# Patient Record
Sex: FEMALE | Race: BLACK OR AFRICAN AMERICAN | Employment: FULL TIME | URBAN - METROPOLITAN AREA
[De-identification: names, ages, dates, MRNs, and addresses within clinical notes are randomized per-mention and may not be internally consistent; named-entity substitution may affect disease eponyms.]

---

## 2017-01-04 NOTE — LETTER
June 11, 2020     [address] [place] [address]      Dear Phillip Moore:    Below are the results from your recent visit:    Resulted Orders   T PALLIDUM SCREENING CASCADE   Result Value Ref Range    Treponemal Antibodies Negative Negat
Self

## 2017-01-26 ENCOUNTER — TELEPHONE (OUTPATIENT)
Dept: INTERNAL MEDICINE CLINIC | Facility: CLINIC | Age: 32
End: 2017-01-26

## 2017-09-05 ENCOUNTER — OFFICE VISIT (OUTPATIENT)
Dept: INTERNAL MEDICINE CLINIC | Facility: CLINIC | Age: 32
End: 2017-09-05

## 2017-09-05 ENCOUNTER — NURSE TRIAGE (OUTPATIENT)
Dept: OTHER | Age: 32
End: 2017-09-05

## 2017-09-05 VITALS
WEIGHT: 163 LBS | DIASTOLIC BLOOD PRESSURE: 85 MMHG | OXYGEN SATURATION: 100 % | HEART RATE: 102 BPM | BODY MASS INDEX: 26.2 KG/M2 | SYSTOLIC BLOOD PRESSURE: 126 MMHG | TEMPERATURE: 98 F | HEIGHT: 66 IN

## 2017-09-05 DIAGNOSIS — J04.0 LARYNGITIS: Primary | ICD-10-CM

## 2017-09-05 PROCEDURE — 99213 OFFICE O/P EST LOW 20 MIN: CPT | Performed by: INTERNAL MEDICINE

## 2017-09-05 PROCEDURE — 99212 OFFICE O/P EST SF 10 MIN: CPT | Performed by: INTERNAL MEDICINE

## 2017-09-05 RX ORDER — AMOXICILLIN 500 MG/1
TABLET, FILM COATED ORAL
Refills: 0 | COMMUNITY
Start: 2017-07-14 | End: 2017-09-05

## 2017-09-05 RX ORDER — FLUCONAZOLE 150 MG/1
TABLET ORAL
Refills: 0 | COMMUNITY
Start: 2017-08-02 | End: 2017-09-29

## 2017-09-05 RX ORDER — IBUPROFEN 600 MG/1
TABLET ORAL
Refills: 0 | COMMUNITY
Start: 2017-07-14 | End: 2017-09-29

## 2017-09-05 NOTE — TELEPHONE ENCOUNTER
Reason for Disposition  • SEVERE sore throat pain    Protocols used: SORE THROAT-A-OH  Action Requested: Summary for Provider     []  Critical Lab, Recommendations Needed  [] Need Additional Advice  [x]   FYI    []   Need Orders  [] Need Medications Sent

## 2017-09-05 NOTE — PROGRESS NOTES
Patient ID: Linda Sommers is a 27year old female. Patient presents with:  Sore Throat: x3 days  Cough: Reports SOB when coughing        HISTORY OF PRESENT ILLNESS:   HPI  Patient resents for above. Has lost her voice for the past 3 days.   Has been co name: N/A    Years of education: N/A  Number of children: N/A     Occupational History  None on file     Social History Main Topics   Smoking status: Never Smoker    Smokeless tobacco: Never Used    Alcohol use Yes  0.0 oz/week     Drug use: No    Sexual a

## 2017-09-25 ENCOUNTER — NURSE TRIAGE (OUTPATIENT)
Dept: OTHER | Age: 32
End: 2017-09-25

## 2017-09-25 NOTE — TELEPHONE ENCOUNTER
Action Requested: Summary for Provider     []  Critical Lab, Recommendations Needed  [] Need Additional Advice  []   FYI    []   Need Orders  [] Need Medications Sent to Pharmacy  []  Other     SUMMARY: Appointment with Dr Nathan Macias on 9/29 Friday  1:40 P

## 2017-09-29 ENCOUNTER — OFFICE VISIT (OUTPATIENT)
Dept: INTERNAL MEDICINE CLINIC | Facility: CLINIC | Age: 32
End: 2017-09-29

## 2017-09-29 VITALS
HEIGHT: 66 IN | HEART RATE: 96 BPM | BODY MASS INDEX: 25.39 KG/M2 | DIASTOLIC BLOOD PRESSURE: 88 MMHG | WEIGHT: 158 LBS | SYSTOLIC BLOOD PRESSURE: 129 MMHG | TEMPERATURE: 98 F

## 2017-09-29 DIAGNOSIS — T78.40XD ALLERGIC STATE, SUBSEQUENT ENCOUNTER: ICD-10-CM

## 2017-09-29 DIAGNOSIS — N76.1 SUBACUTE VAGINITIS: ICD-10-CM

## 2017-09-29 DIAGNOSIS — J02.8 PHARYNGITIS DUE TO OTHER ORGANISM: Primary | ICD-10-CM

## 2017-09-29 DIAGNOSIS — L65.9 HAIR LOSS: ICD-10-CM

## 2017-09-29 PROCEDURE — 99214 OFFICE O/P EST MOD 30 MIN: CPT | Performed by: INTERNAL MEDICINE

## 2017-09-29 PROCEDURE — 99212 OFFICE O/P EST SF 10 MIN: CPT | Performed by: INTERNAL MEDICINE

## 2017-09-29 RX ORDER — AMOXICILLIN AND CLAVULANATE POTASSIUM 875; 125 MG/1; MG/1
1 TABLET, FILM COATED ORAL 2 TIMES DAILY
Qty: 20 TABLET | Refills: 0 | Status: SHIPPED | OUTPATIENT
Start: 2017-09-29 | End: 2017-10-09

## 2017-09-29 RX ORDER — FLUCONAZOLE 150 MG/1
TABLET ORAL
Qty: 3 TABLET | Refills: 0 | Status: SHIPPED | OUTPATIENT
Start: 2017-09-29 | End: 2017-10-27

## 2017-10-07 NOTE — PROGRESS NOTES
HPI:    Patient ID: Willam Yeh is a 27year old female. HPI      Review of Systems   Constitutional: Positive for fatigue. Negative for fever. HENT: Positive for congestion and sore throat. Eyes: Negative. Respiratory: Negative.     Hannah Santa Ear: External ear normal.   Left Ear: External ear normal.   Nose: Nose normal.   Mouth/Throat: Oropharynx is clear and moist. No oropharyngeal exudate. Eyes: Conjunctivae and EOM are normal. Pupils are equal, round, and reactive to light.  Right eye exhi 0      Sig: Take 1 tablet by mouth 2 (two) times daily.            Imaging & Referrals:  ALLERGY - INTERNAL  DERM - INTERNAL        CAIN#[id number]

## 2017-10-20 ENCOUNTER — PATIENT MESSAGE (OUTPATIENT)
Dept: INTERNAL MEDICINE CLINIC | Facility: CLINIC | Age: 32
End: 2017-10-20

## 2017-10-20 ENCOUNTER — NURSE TRIAGE (OUTPATIENT)
Dept: INTERNAL MEDICINE CLINIC | Facility: CLINIC | Age: 32
End: 2017-10-20

## 2017-10-20 NOTE — TELEPHONE ENCOUNTER
From: Sidra Camargo  To: Rima Merino MD  Sent: 10/20/2017 12:30 PM CDT  Subject: Visit Follow-up Question    Yesterday I noticed a slight odor coming from my vagina and a slight white/yellow colored discharged.  I was given an antibiotic Amoxicillin-

## 2017-10-20 NOTE — TELEPHONE ENCOUNTER
TERRI Hamilton: Advised patient of Dr. Quinonez [place] note below. Patient verbalized understanding.  Offered patient appt with Dr. Denise Hamilton for Monday but patient stated she could not come in until Friday Oct. 27. Advised patient she could also go to Urgent Care

## 2017-10-20 NOTE — TELEPHONE ENCOUNTER
Action Requested: Summary for Provider     []  Critical Lab, Recommendations Needed  [x] Need Additional Advice  []   FYI    []   Need Orders  [x] Need Medications Sent to Pharmacy  []  Other     SUMMARY: ADVICE NEEDED, vaginal discharge    Pt states she w

## 2017-10-20 NOTE — TELEPHONE ENCOUNTER
NEED TO BE RE EVALAUTED   WITH DISCHARGE DESCRIBED  WILL NEED TO TO VAGINAL  CULTURE OR TEST NEEDED  SHE WAS COVERED FOR VAGINSOSIS AND YEAST  WILL HAVE TO RULE OUT OTHER CAUSE  NEED TO BE SEE

## 2017-10-27 ENCOUNTER — OFFICE VISIT (OUTPATIENT)
Dept: INTERNAL MEDICINE CLINIC | Facility: CLINIC | Age: 32
End: 2017-10-27

## 2017-10-27 VITALS
HEIGHT: 66 IN | DIASTOLIC BLOOD PRESSURE: 73 MMHG | BODY MASS INDEX: 24.59 KG/M2 | HEART RATE: 105 BPM | WEIGHT: 153 LBS | SYSTOLIC BLOOD PRESSURE: 109 MMHG | TEMPERATURE: 98 F

## 2017-10-27 DIAGNOSIS — N76.1 SUBACUTE VAGINITIS: Primary | ICD-10-CM

## 2017-10-27 PROCEDURE — 99212 OFFICE O/P EST SF 10 MIN: CPT | Performed by: INTERNAL MEDICINE

## 2017-10-27 PROCEDURE — 99213 OFFICE O/P EST LOW 20 MIN: CPT | Performed by: INTERNAL MEDICINE

## 2017-10-28 ENCOUNTER — PATIENT MESSAGE (OUTPATIENT)
Dept: INTERNAL MEDICINE CLINIC | Facility: CLINIC | Age: 32
End: 2017-10-28

## 2017-10-29 ENCOUNTER — PATIENT MESSAGE (OUTPATIENT)
Dept: OTHER | Age: 32
End: 2017-10-29

## 2017-10-29 ENCOUNTER — TELEPHONE (OUTPATIENT)
Dept: INTERNAL MEDICINE CLINIC | Facility: CLINIC | Age: 32
End: 2017-10-29

## 2017-10-30 ENCOUNTER — TELEPHONE (OUTPATIENT)
Dept: OTHER | Age: 32
End: 2017-10-30

## 2017-10-30 NOTE — TELEPHONE ENCOUNTER
From: Krzysztof Harper  To: Charley Donohue MD  Sent: 10/28/2017 1:45 PM CDT  Subject: Visit Nohemi Scott,  I need a doctor's note for work. I missed work on Friday to attend the doctors appointment. I return to work on Monday.  I

## 2017-10-30 NOTE — TELEPHONE ENCOUNTER
Patient is calling and requesting a Return to Work note, she also sent a [employer] email/message regarding the note last Friday 10/28/17, she missed work Friday and returning to work today 10/30. Please advise. Carol Herbert

## 2017-10-30 NOTE — TELEPHONE ENCOUNTER
Treat BV       ERX sent to pharmacy for   metrogel vaginal      Call paient    Notes Recorded by Corbin Rosas MD on 10/29/2017 at 9:48 PM CDT  Send letter and copy of test result.   Gonorrhea/chlamydia negative  Positive for bacterial vaginosis

## 2017-11-01 NOTE — TELEPHONE ENCOUNTER
Spoke with Meseret Mclaughlin and verified patient picked up Rx for Metrogel 10/30/17. Spoke with patient and letter mailed out today. No further questions/concerns at this time.

## 2017-11-04 NOTE — PROGRESS NOTES
HPI:    Patient ID: Boaz August is a 27year old female. HPI    Vaginal discharge and irritatioin    Review of Systems   Constitutional: Negative. Gastrointestinal: Negative. Genitourinary: Positive for vaginal discharge and vaginal pain.  Renae Arroyo Subacute vaginitis  (primary encounter diagnosis)  Plan: GENITAL VAGINOSIS SCREEN, CHLAMYDIA/GONOCOCCUS,        NOELLE, GENITAL VAGINOSIS SCREEN,         CHLAMYDIA/GONOCOCCUS, NOELLE               Orders Placed This Encounter      Gentital vaginosis screen

## 2017-12-10 ENCOUNTER — PATIENT MESSAGE (OUTPATIENT)
Dept: INTERNAL MEDICINE CLINIC | Facility: CLINIC | Age: 32
End: 2017-12-10

## 2017-12-10 RX ORDER — DESOGESTREL AND ETHINYL ESTRADIOL 0.15-0.03
1 KIT ORAL DAILY
Qty: 84 TABLET | Refills: 0 | Status: CANCELLED | OUTPATIENT
Start: 2017-12-10

## 2017-12-10 RX ORDER — DESOGESTREL AND ETHINYL ESTRADIOL 0.15-0.03
1 KIT ORAL DAILY
Qty: 3 PACKAGE | Refills: 3 | Status: CANCELLED
Start: 2017-12-10

## 2017-12-11 ENCOUNTER — TELEPHONE (OUTPATIENT)
Dept: OTHER | Age: 32
End: 2017-12-11

## 2017-12-11 RX ORDER — DESOGESTREL AND ETHINYL ESTRADIOL 0.15-0.03
1 KIT ORAL DAILY
Qty: 1 PACKAGE | Refills: 0 | Status: SHIPPED | OUTPATIENT
Start: 2017-12-11 | End: 2017-12-29

## 2017-12-11 NOTE — TELEPHONE ENCOUNTER
Pharmacy     [address] AT [facility], [phone], [phone]   Medication Detail      Disp Refills Start End    Desogestrel-Ethinyl Estradiol (DESOGEN) 0.15-30 MG-MCG Oral Tab 1 Packa

## 2017-12-11 NOTE — TELEPHONE ENCOUNTER
Patient is out of birth control medication - states was supposed to start yesterday. Sent a my chart message as well.      Gynecology Medications  Protocol Criteria:  · Appointment scheduled in the past 12 months or the next 3 months  · Pap smear in the pas

## 2017-12-11 NOTE — TELEPHONE ENCOUNTER
From: Estrella Moran  To: Cassie Morris MD  Sent: 12/10/2017 12:07 PM CST  Subject: Prescription Question     Can I please have a refill on my birth control pills? I am at start my pills up again today and pharmacy informed me that I am out of refills.

## 2017-12-29 ENCOUNTER — OFFICE VISIT (OUTPATIENT)
Dept: OBGYN CLINIC | Facility: CLINIC | Age: 32
End: 2017-12-29

## 2017-12-29 VITALS
HEART RATE: 86 BPM | HEIGHT: 65.5 IN | BODY MASS INDEX: 26.31 KG/M2 | DIASTOLIC BLOOD PRESSURE: 75 MMHG | SYSTOLIC BLOOD PRESSURE: 111 MMHG | WEIGHT: 159.81 LBS

## 2017-12-29 DIAGNOSIS — Z01.419 ENCOUNTER FOR GYNECOLOGICAL EXAMINATION WITHOUT ABNORMAL FINDING: Primary | ICD-10-CM

## 2017-12-29 DIAGNOSIS — Z30.41 ENCOUNTER FOR BIRTH CONTROL PILLS MAINTENANCE: ICD-10-CM

## 2017-12-29 PROCEDURE — 99395 PREV VISIT EST AGE 18-39: CPT | Performed by: OBSTETRICS & GYNECOLOGY

## 2017-12-29 RX ORDER — DESOGESTREL AND ETHINYL ESTRADIOL 0.15-0.03
1 KIT ORAL DAILY
Qty: 3 PACKAGE | Refills: 4 | Status: SHIPPED | OUTPATIENT
Start: 2017-12-29 | End: 2019-12-30

## 2017-12-29 NOTE — PROGRESS NOTES
Well Woman Exam    HPI:  The patient is a 30yo female who presents for annual exam. She has no complaints. She is happy with OCPs but not happy with generic that she was just given. She asked for me to write for dispense as written.      Reviewed medical an BASE) MCG/ACT Inhalation Aero Soln, Inhale 4 puffs every 2 hours, Disp: 1 Inhaler, Rfl: 0    ALLERGIES:    Egg                     Respiratory failure  Shellfish               Respiratory failure      Review of Systems:  Constitutional:  Denies fevers and 2. Contraception:   1. eRx sent for OCPs   3. Breast Health:     1.  Reviewed current guidelines with recommendation to start mammograms at age 36; however, ACOG encourages shared decision making with the patient and together we reach appropriate screenin

## 2018-01-26 ENCOUNTER — OFFICE VISIT (OUTPATIENT)
Dept: INTERNAL MEDICINE CLINIC | Facility: CLINIC | Age: 33
End: 2018-01-26

## 2018-01-26 VITALS
BODY MASS INDEX: 25.99 KG/M2 | HEART RATE: 78 BPM | DIASTOLIC BLOOD PRESSURE: 80 MMHG | WEIGHT: 156 LBS | HEIGHT: 65 IN | SYSTOLIC BLOOD PRESSURE: 120 MMHG | TEMPERATURE: 98 F | RESPIRATION RATE: 18 BRPM

## 2018-01-26 DIAGNOSIS — R21 RASH: ICD-10-CM

## 2018-01-26 DIAGNOSIS — J01.00 ACUTE NON-RECURRENT MAXILLARY SINUSITIS: Primary | ICD-10-CM

## 2018-01-26 PROCEDURE — 99213 OFFICE O/P EST LOW 20 MIN: CPT | Performed by: PHYSICIAN ASSISTANT

## 2018-01-26 RX ORDER — AMOXICILLIN AND CLAVULANATE POTASSIUM 875; 125 MG/1; MG/1
1 TABLET, FILM COATED ORAL 2 TIMES DAILY
Qty: 20 TABLET | Refills: 0 | Status: SHIPPED | OUTPATIENT
Start: 2018-01-26 | End: 2018-02-05

## 2018-01-26 RX ORDER — FLUCONAZOLE 150 MG/1
TABLET ORAL
Qty: 2 TABLET | Refills: 0 | Status: SHIPPED | OUTPATIENT
Start: 2018-01-26 | End: 2020-01-17

## 2018-01-26 NOTE — PROGRESS NOTES
HPI:    Patient ID: Juliet Murray is a 27year old female. HPI  Patient presents today with upper respiratory symptoms for the last 2 weeks. Symptoms have significantly worsening in the last 2 days.  She has sore throat, nasal congestion, left maxillar Inhalation Aero Soln Inhale 2 puffs (0.08 mg total) into the lungs 2 (two) times daily.  Disp: 1 Inhaler Rfl: 5   Albuterol Sulfate HFA (PROAIR HFA) 108 (90 BASE) MCG/ACT Inhalation Aero Soln Inhale 4 puffs every 2 hours Disp: 1 Inhaler Rfl: 0     Allergies days. Apply Eucerin liberally as needed to the affected areas. Drink plenty of fluids. No orders of the defined types were placed in this encounter.       Meds This Visit:  Signed Prescriptions Disp Refills    Amoxicillin-Pot Clavulanate 875-125 MG Oral

## 2018-01-31 ENCOUNTER — TELEPHONE (OUTPATIENT)
Dept: OTHER | Age: 33
End: 2018-01-31

## 2018-01-31 RX ORDER — LEVOFLOXACIN 500 MG/1
500 TABLET, FILM COATED ORAL DAILY
Qty: 7 TABLET | Refills: 0 | Status: SHIPPED | OUTPATIENT
Start: 2018-01-31 | End: 2020-01-17

## 2018-01-31 RX ORDER — METHYLPREDNISOLONE 4 MG/1
TABLET ORAL
Qty: 1 KIT | Refills: 0 | Status: SHIPPED | OUTPATIENT
Start: 2018-01-31 | End: 2019-08-16 | Stop reason: ALTCHOICE

## 2018-01-31 NOTE — TELEPHONE ENCOUNTER
Please advise. Thank you. Please reply to pool: EM RN TRIAGE    Pt calling (Name and  verified) stating that she saw CAITLIN. Froy last Friday and was instructed to call back if her Sx did not improve for possible additional Rx.     Pt states that she has b

## 2018-01-31 NOTE — TELEPHONE ENCOUNTER
Spoke pt and advised of Carmelita's message to stop amoxicillin and switch to levaquin. Pt verb understanding.

## 2018-01-31 NOTE — TELEPHONE ENCOUNTER
Spoke with pt and Carmelita Mcduffie's message given. Pt verb understanding. Pt asking if she is supposed to stop amoxicillin.

## 2018-01-31 NOTE — TELEPHONE ENCOUNTER
Levaquin 500 mg once daily with food for 7 days. Medrol dose pack as directed. Continue inhaler as needed. Rx sent to pharmacy.

## 2018-03-06 NOTE — TELEPHONE ENCOUNTER
Call placed to Pt  Pt made aware wet mount and GC/Chlam results.   Pt made aware TG's recommendation.   Pt verb understanding.   Please see rx called in     Pt called back for update-advised Letter c/p and resent to Mychart/Pt received

## 2018-09-20 ENCOUNTER — OFFICE VISIT (OUTPATIENT)
Dept: OBGYN CLINIC | Facility: CLINIC | Age: 33
End: 2018-09-20
Payer: COMMERCIAL

## 2018-09-20 VITALS
SYSTOLIC BLOOD PRESSURE: 110 MMHG | HEART RATE: 86 BPM | DIASTOLIC BLOOD PRESSURE: 74 MMHG | WEIGHT: 165 LBS | BODY MASS INDEX: 27 KG/M2

## 2018-09-20 DIAGNOSIS — L29.2 VULVAR ITCHING: Primary | ICD-10-CM

## 2018-09-20 PROCEDURE — 99213 OFFICE O/P EST LOW 20 MIN: CPT | Performed by: OBSTETRICS & GYNECOLOGY

## 2018-09-20 NOTE — PROGRESS NOTES
Yelitza Tolentino    7/10/1985       Patient presents with:  Gyn Exam: Vaginal itching   Pt with vulvar itching since her last menses ended 4 days ago. She describes itching,buring and white d/c.   She has had her plaquenil increased recently and wonders shaved  Urethral Meatus:  normal in size, location, without lesions and prolapse  Bladder:  No fullness, masses or tenderness  Vagina:  Normal appearance without lesions,white creamy d/c- ?  Not c/w yeast  Cervix:  Normal without tenderness on motion  Perin

## 2018-09-22 LAB
GENITAL VAGINOSIS SCREEN: NEGATIVE
TRICHOMONAS SCREEN: NEGATIVE

## 2018-09-24 ENCOUNTER — PATIENT MESSAGE (OUTPATIENT)
Dept: OBGYN CLINIC | Facility: CLINIC | Age: 33
End: 2018-09-24

## 2018-09-24 NOTE — TELEPHONE ENCOUNTER
From: Matthew Cesar  To: Christie Hill MD  Sent: 9/24/2018 6:20 PM CDT  Subject: Test Results Parth Kunz,    Did my test results come back from my office visit on Thursday 9/20/18?  Thank you!!

## 2018-09-25 ENCOUNTER — TELEPHONE (OUTPATIENT)
Dept: OBGYN CLINIC | Facility: CLINIC | Age: 33
End: 2018-09-25

## 2018-09-25 RX ORDER — FLUCONAZOLE 150 MG/1
150 TABLET ORAL ONCE
Qty: 1 TABLET | Refills: 0 | Status: SHIPPED | OUTPATIENT
Start: 2018-09-25 | End: 2018-09-25

## 2018-09-25 NOTE — TELEPHONE ENCOUNTER
Pt informed that CAP is seeing pts and has not reviewed message yet. Pt informed that if she does not want to wait for recs then she can start Monistat. Pt stated she would prefer Diflucan and will wait for CAps recs.

## 2018-09-25 NOTE — TELEPHONE ENCOUNTER
Okay to send an erx for diflucan 150mg po x1 for yeast infection if pt prefers that to monistat, although I do believe that the topical treatments work better

## 2018-12-06 RX ORDER — DESOGESTREL AND ETHINYL ESTRADIOL 0.15-0.03
KIT ORAL
Qty: 84 TABLET | Refills: 0 | OUTPATIENT
Start: 2018-12-06

## 2018-12-06 RX ORDER — DESOGESTREL AND ETHINYL ESTRADIOL 0.15-0.03
1 KIT ORAL DAILY
Qty: 84 TABLET | Refills: 0 | Status: SHIPPED | OUTPATIENT
Start: 2018-12-06 | End: 2019-12-13

## 2018-12-06 NOTE — TELEPHONE ENCOUNTER
LAST ANNUAL 12-29-17, LAST PAP 5-27-14 AND NEXT ANNUAL SCHEDULED FOR 12-31-18. DECLINED REFILL REQUEST FOR ISIBLOOM BECAUSE RX WAS WRITTEN FOR MICHAEL MARK. REFILL SENT FOR #84 PER PROTOCOL.

## 2019-08-16 ENCOUNTER — OFFICE VISIT (OUTPATIENT)
Dept: INTERNAL MEDICINE CLINIC | Facility: CLINIC | Age: 34
End: 2019-08-16
Payer: COMMERCIAL

## 2019-08-16 VITALS
DIASTOLIC BLOOD PRESSURE: 81 MMHG | SYSTOLIC BLOOD PRESSURE: 123 MMHG | HEART RATE: 76 BPM | HEIGHT: 65 IN | WEIGHT: 164 LBS | TEMPERATURE: 99 F | OXYGEN SATURATION: 100 % | BODY MASS INDEX: 27.32 KG/M2

## 2019-08-16 DIAGNOSIS — R05.9 COUGH: Primary | ICD-10-CM

## 2019-08-16 PROCEDURE — 99213 OFFICE O/P EST LOW 20 MIN: CPT | Performed by: PHYSICIAN ASSISTANT

## 2019-08-16 RX ORDER — BENZONATATE 100 MG/1
100 CAPSULE ORAL 3 TIMES DAILY PRN
Qty: 30 CAPSULE | Refills: 0 | Status: SHIPPED | OUTPATIENT
Start: 2019-08-16 | End: 2020-01-17

## 2019-08-16 NOTE — PROGRESS NOTES
HPI:    Patient ID: Linda Sommers is a 37year old female. HPI   Pt presents complaining of HA congestion, left ear drainage three days. States her daughter was sick earlier. Pt also complains of a cough, with intermittent mucus production.  Denies fev Smoking status: Never Smoker      Smokeless tobacco: Never Used    Alcohol use:  Yes      Alcohol/week: 0.0 standard drinks    Past Surgical History:   Procedure Laterality Date   •      • OTHER SURGICAL HISTORY      excision nasal mass, benign   \  Fam No orders of the defined types were placed in this encounter. No follow-ups on file.     Meds This Visit:  Requested Prescriptions     Signed Prescriptions Disp Refills   • benzonatate (TESSALON PERLES) 100 MG Oral Cap 30 capsule 0     Sig: Take 1 caps

## 2019-09-06 ENCOUNTER — PATIENT MESSAGE (OUTPATIENT)
Dept: INTERNAL MEDICINE CLINIC | Facility: CLINIC | Age: 34
End: 2019-09-06

## 2019-09-07 ENCOUNTER — TELEPHONE (OUTPATIENT)
Dept: INTERNAL MEDICINE CLINIC | Facility: CLINIC | Age: 34
End: 2019-09-07

## 2019-09-07 NOTE — TELEPHONE ENCOUNTER
From: Usman Cyr  To: Damaris Wynn PA-C  Sent: 9/6/2019 3:32 PM CDT  Subject: Prescription Question    Hi,    I still have a cough from my last visit on 8/16. Can you send me something to my pharmacy on file for my cough? Thank you.

## 2019-09-07 NOTE — TELEPHONE ENCOUNTER
Per last office visit on 8/16/19 an rx was sent to the pharmacy for [facility]. Attempt made to contact patient to further triage; no answer LMTCB. [employer] message sent as well.         ----- Message from [address] [place] [address].  Joanna sent at 9/6/2019  3:32 PM CDT

## 2019-09-09 RX ORDER — CODEINE PHOSPHATE AND GUAIFENESIN 10; 100 MG/5ML; MG/5ML
5 SOLUTION ORAL EVERY 6 HOURS PRN
Qty: 120 ML | Refills: 0 | OUTPATIENT
Start: 2019-09-09 | End: 2020-01-17

## 2019-09-09 NOTE — TELEPHONE ENCOUNTER
Spoke with patient and relayed AA message below--patient verbalizes understanding and agreement. No further questions/concerns at this time. Spoke with Carmita Harvey pharmacist and phoned in [facility] as ordered by AA today.  No further questions/

## 2019-09-09 NOTE — TELEPHONE ENCOUNTER
Spoke with [facility] pharmacy--verifies patient did not  [employer] as ordered 8/16/2019--it is ready for . Spoke with patient--states she DID  Tesaakashon Perles 8/16/19--has 10 pills left.  \"She told me sometimes this medicatio

## 2019-09-09 NOTE — TELEPHONE ENCOUNTER
Please phone in rx for cheratussin [facility] and let pt know it can make you drowsy

## 2019-11-26 ENCOUNTER — OFFICE VISIT (OUTPATIENT)
Dept: OBGYN CLINIC | Facility: CLINIC | Age: 34
End: 2019-11-26
Payer: COMMERCIAL

## 2019-11-26 VITALS
WEIGHT: 157 LBS | SYSTOLIC BLOOD PRESSURE: 107 MMHG | DIASTOLIC BLOOD PRESSURE: 71 MMHG | BODY MASS INDEX: 26 KG/M2 | HEART RATE: 90 BPM

## 2019-11-26 DIAGNOSIS — L29.2 VULVAR ITCHING: Primary | ICD-10-CM

## 2019-11-26 PROCEDURE — 99213 OFFICE O/P EST LOW 20 MIN: CPT | Performed by: OBSTETRICS & GYNECOLOGY
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
## 2019-12-13 ENCOUNTER — OFFICE VISIT (OUTPATIENT)
Dept: INTERNAL MEDICINE CLINIC | Facility: CLINIC | Age: 34
End: 2019-12-13
Payer: COMMERCIAL

## 2019-12-13 VITALS
SYSTOLIC BLOOD PRESSURE: 109 MMHG | WEIGHT: 155.06 LBS | RESPIRATION RATE: 17 BRPM | DIASTOLIC BLOOD PRESSURE: 73 MMHG | BODY MASS INDEX: 25.83 KG/M2 | HEIGHT: 65 IN | HEART RATE: 88 BPM | TEMPERATURE: 98 F

## 2019-12-13 DIAGNOSIS — J01.00 ACUTE NON-RECURRENT MAXILLARY SINUSITIS: Primary | ICD-10-CM

## 2019-12-13 PROCEDURE — 99213 OFFICE O/P EST LOW 20 MIN: CPT | Performed by: NURSE PRACTITIONER

## 2019-12-13 RX ORDER — BENZONATATE 100 MG/1
100 CAPSULE ORAL 3 TIMES DAILY PRN
Qty: 20 CAPSULE | Refills: 0 | Status: SHIPPED | OUTPATIENT
Start: 2019-12-13 | End: 2019-12-20

## 2019-12-13 RX ORDER — FLUCONAZOLE 150 MG/1
150 TABLET ORAL ONCE
Qty: 1 TABLET | Refills: 0 | Status: SHIPPED | OUTPATIENT
Start: 2019-12-13 | End: 2019-12-13

## 2019-12-13 RX ORDER — AZITHROMYCIN 250 MG/1
TABLET, FILM COATED ORAL
Qty: 6 TABLET | Refills: 0 | Status: SHIPPED | OUTPATIENT
Start: 2019-12-13 | End: 2020-01-17

## 2019-12-13 NOTE — PROGRESS NOTES
HPI:    Patient ID: Osman Quigley is a 37year old female. LMP First Wednesday of this month. Dec 4th, 2019. HPI Patient pain and pressure in her left sinus area. Pain started a week ago. She is fatigued and complaining of swollen glands (Neck Pain). 100 MG Oral Cap Take 1 capsule (100 mg total) by mouth 3 (three) times daily as needed for cough. (Patient not taking: Reported on 12/13/2019 ) 30 capsule 0   • levofloxacin 500 MG Oral Tab Take 1 tablet (500 mg total) by mouth daily.  (Patient not taking: deficit. Coordination normal.   Skin: Skin is warm and dry. No rash noted. Psychiatric: She has a normal mood and affect.  Thought content normal.     /73   Pulse 88   Temp 98.4 °F (36.9 °C) (Oral)   Resp 17   Ht 5' 5\" (1.651 m)   Wt 155 lb 1 oz (7

## 2019-12-14 NOTE — ASSESSMENT & PLAN NOTE
A/P 37year old female with complaints of pain and pressure in her left maxillary sinus. Pain level is 4/10 and is constant. She has had swollen glands, nasal burning, cough, and fatigue for the past 5 days. She has increased her fluid intake.  Patient requ

## 2019-12-14 NOTE — PROGRESS NOTES
Sidra Camargo is a 37year old female. LMP First Wednesday of this month. HPI Patient pain and pressure in her left sinus area. Pain started a week ago. She is fatigued. Pain level 4/10. Pain is constant. She also has swollen glands.   She has been t ) 7 tablet 0   • fluconazole 150 MG Oral Tab Take one tablet by mouth once. Can take second tablet after 3 days if needed.  (Patient not taking: Reported on 12/13/2019 ) 2 tablet 0   • Beclomethasone Dipropionate 40 MCG/ACT Inhalation Aero Soln Inhale 2 puf Readings from Last 2 Encounters:  12/13/19 : 155 lb 1 oz (70.3 kg)  11/26/19 : 157 lb (71.2 kg)    Body mass index is 25.8 kg/m². (2)           ASSESSMENT/PLAN:     Problem List Items Addressed This Visit     None         A/P 37year old female with complai

## 2019-12-17 ENCOUNTER — TELEPHONE (OUTPATIENT)
Dept: INTERNAL MEDICINE CLINIC | Facility: CLINIC | Age: 34
End: 2019-12-17

## 2019-12-17 NOTE — TELEPHONE ENCOUNTER
Left message to call back  Spoke with [facility], they [facility], left msg to see if patient wants to transfer script to another [facility] or other pharmacy.  Script was prescribed on 12/13, confirm if anicetol

## 2019-12-17 NOTE — TELEPHONE ENCOUNTER
Advised patient that [facility] called indicating that Z-Zackery was on back order. Put patient on hold and called over to the [address] on [address] their systems are down and told me to call back later.  Advised patient that would try calling

## 2019-12-30 ENCOUNTER — OFFICE VISIT (OUTPATIENT)
Dept: OBGYN CLINIC | Facility: CLINIC | Age: 34
End: 2019-12-30
Payer: COMMERCIAL

## 2019-12-30 VITALS
DIASTOLIC BLOOD PRESSURE: 71 MMHG | HEART RATE: 87 BPM | SYSTOLIC BLOOD PRESSURE: 109 MMHG | WEIGHT: 157.38 LBS | HEIGHT: 65 IN | BODY MASS INDEX: 26.22 KG/M2

## 2019-12-30 DIAGNOSIS — Z01.419 WELL WOMAN EXAM: Primary | ICD-10-CM

## 2019-12-30 DIAGNOSIS — Z12.4 SCREENING FOR MALIGNANT NEOPLASM OF CERVIX: ICD-10-CM

## 2019-12-30 PROCEDURE — 99395 PREV VISIT EST AGE 18-39: CPT | Performed by: OBSTETRICS & GYNECOLOGY

## 2019-12-30 RX ORDER — DESOGESTREL AND ETHINYL ESTRADIOL 0.15-0.03
1 KIT ORAL DAILY
Qty: 3 PACKAGE | Refills: 3 | Status: SHIPPED | OUTPATIENT
Start: 2019-12-30 | End: 2020-01-02
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
## 2020-01-02 ENCOUNTER — TELEPHONE (OUTPATIENT)
Dept: OBGYN CLINIC | Facility: CLINIC | Age: 35
End: 2020-01-02

## 2020-01-02 RX ORDER — DESOGESTREL AND ETHINYL ESTRADIOL 0.15-0.03
1 KIT ORAL DAILY
Qty: 3 PACKAGE | Refills: 3 | Status: SHIPPED | OUTPATIENT
Start: 2020-01-02 | End: 2020-09-30

## 2020-01-02 NOTE — TELEPHONE ENCOUNTER
Message to [address] [place] [address]. Rx for Desogen was sent MICHAEL. Can generic be given?

## 2020-01-07 ENCOUNTER — TELEPHONE (OUTPATIENT)
Dept: OBGYN CLINIC | Facility: CLINIC | Age: 35
End: 2020-01-07

## 2020-01-07 RX ORDER — FLUCONAZOLE 150 MG/1
TABLET ORAL
Qty: 2 TABLET | Refills: 0 | Status: SHIPPED | OUTPATIENT
Start: 2020-01-07 | End: 2020-01-17

## 2020-01-07 NOTE — TELEPHONE ENCOUNTER
----- Message from Kenny Prasad DO sent at 1/3/2020  6:35 PM CST -----  Pap/hpv neg. Pt was having itching and genital culture was negative. Yeast on pap smear however.   Needs diflucan x 2 doses

## 2020-01-09 ENCOUNTER — TELEPHONE (OUTPATIENT)
Dept: OBGYN CLINIC | Facility: CLINIC | Age: 35
End: 2020-01-09

## 2020-01-09 NOTE — TELEPHONE ENCOUNTER
Pt requesting a copy of the work note that was given on 12/30/2019, also pt needs it signed. Please advise.     Can be sent Via [employer] or faxed over to work     FAX: [phone]

## 2020-01-17 ENCOUNTER — OFFICE VISIT (OUTPATIENT)
Dept: INTERNAL MEDICINE CLINIC | Facility: CLINIC | Age: 35
End: 2020-01-17
Payer: COMMERCIAL

## 2020-01-17 ENCOUNTER — APPOINTMENT (OUTPATIENT)
Dept: LAB | Age: 35
End: 2020-01-17
Attending: INTERNAL MEDICINE
Payer: COMMERCIAL

## 2020-01-17 VITALS
DIASTOLIC BLOOD PRESSURE: 67 MMHG | WEIGHT: 159 LBS | BODY MASS INDEX: 26.49 KG/M2 | HEART RATE: 86 BPM | TEMPERATURE: 98 F | SYSTOLIC BLOOD PRESSURE: 103 MMHG | HEIGHT: 65 IN

## 2020-01-17 DIAGNOSIS — Z00.00 ROUTINE GENERAL MEDICAL EXAMINATION AT A HEALTH CARE FACILITY: Primary | ICD-10-CM

## 2020-01-17 DIAGNOSIS — Z00.00 ROUTINE GENERAL MEDICAL EXAMINATION AT A HEALTH CARE FACILITY: ICD-10-CM

## 2020-01-17 LAB
ALBUMIN SERPL-MCNC: 3.7 G/DL (ref 3.4–5)
ALBUMIN/GLOB SERPL: 1 {RATIO} (ref 1–2)
ALP LIVER SERPL-CCNC: 34 U/L (ref 37–98)
ALT SERPL-CCNC: 16 U/L (ref 13–56)
ANION GAP SERPL CALC-SCNC: 2 MMOL/L (ref 0–18)
AST SERPL-CCNC: 11 U/L (ref 15–37)
BACTERIA UR QL AUTO: NEGATIVE /HPF
BILIRUB SERPL-MCNC: 0.3 MG/DL (ref 0.1–2)
BUN BLD-MCNC: 9 MG/DL (ref 7–18)
BUN/CREAT SERPL: 12.5 (ref 10–20)
CALCIUM BLD-MCNC: 8.3 MG/DL (ref 8.5–10.1)
CHLORIDE SERPL-SCNC: 108 MMOL/L (ref 98–112)
CHOLEST SMN-MCNC: 171 MG/DL (ref ?–200)
CO2 SERPL-SCNC: 30 MMOL/L (ref 21–32)
CREAT BLD-MCNC: 0.72 MG/DL (ref 0.55–1.02)
DEPRECATED RDW RBC AUTO: 40.9 FL (ref 35.1–46.3)
ERYTHROCYTE [DISTWIDTH] IN BLOOD BY AUTOMATED COUNT: 12.3 % (ref 11–15)
EST. AVERAGE GLUCOSE BLD GHB EST-MCNC: 97 MG/DL (ref 68–126)
GLOBULIN PLAS-MCNC: 3.8 G/DL (ref 2.8–4.4)
GLUCOSE BLD-MCNC: 71 MG/DL (ref 70–99)
HBA1C MFR BLD HPLC: 5 % (ref ?–5.7)
HCT VFR BLD AUTO: 39.7 % (ref 35–48)
HDLC SERPL-MCNC: 99 MG/DL (ref 40–59)
HGB BLD-MCNC: 12.4 G/DL (ref 12–16)
LDLC SERPL CALC-MCNC: 64 MG/DL (ref ?–100)
M PROTEIN MFR SERPL ELPH: 7.5 G/DL (ref 6.4–8.2)
MCH RBC QN AUTO: 27.9 PG (ref 26–34)
MCHC RBC AUTO-ENTMCNC: 31.2 G/DL (ref 31–37)
MCV RBC AUTO: 89.2 FL (ref 80–100)
NONHDLC SERPL-MCNC: 72 MG/DL (ref ?–130)
OSMOLALITY SERPL CALC.SUM OF ELEC: 287 MOSM/KG (ref 275–295)
PATIENT FASTING Y/N/NP: YES
PATIENT FASTING Y/N/NP: YES
PLATELET # BLD AUTO: 253 10(3)UL (ref 150–450)
POTASSIUM SERPL-SCNC: 3.9 MMOL/L (ref 3.5–5.1)
RBC # BLD AUTO: 4.45 X10(6)UL (ref 3.8–5.3)
RBC #/AREA URNS AUTO: 5 /HPF
SODIUM SERPL-SCNC: 140 MMOL/L (ref 136–145)
T4 FREE SERPL-MCNC: 0.9 NG/DL (ref 0.8–1.7)
TRIGL SERPL-MCNC: 42 MG/DL (ref 30–149)
TSI SER-ACNC: 0.53 MIU/ML (ref 0.36–3.74)
VLDLC SERPL CALC-MCNC: 8 MG/DL (ref 0–30)
WBC # BLD AUTO: 4.6 X10(3) UL (ref 4–11)
WBC #/AREA URNS AUTO: 2 /HPF

## 2020-01-17 PROCEDURE — 84443 ASSAY THYROID STIM HORMONE: CPT

## 2020-01-17 PROCEDURE — 83036 HEMOGLOBIN GLYCOSYLATED A1C: CPT

## 2020-01-17 PROCEDURE — 80061 LIPID PANEL: CPT

## 2020-01-17 PROCEDURE — 85027 COMPLETE CBC AUTOMATED: CPT

## 2020-01-17 PROCEDURE — 82306 VITAMIN D 25 HYDROXY: CPT

## 2020-01-17 PROCEDURE — 81015 MICROSCOPIC EXAM OF URINE: CPT

## 2020-01-17 PROCEDURE — 99395 PREV VISIT EST AGE 18-39: CPT | Performed by: INTERNAL MEDICINE

## 2020-01-17 PROCEDURE — 36415 COLL VENOUS BLD VENIPUNCTURE: CPT

## 2020-01-17 PROCEDURE — 84439 ASSAY OF FREE THYROXINE: CPT

## 2020-01-17 PROCEDURE — 80053 COMPREHEN METABOLIC PANEL: CPT

## 2020-01-17 NOTE — TELEPHONE ENCOUNTER
PER PATIENT REQUESTING TO HAVE THE NOTE FOR HER JOB / PT REQUESTING A COPY TO  AT [facility] BECAUSE HER JOB WOULD NOT ACCEPT A FAX COPY / Rosario Abel

## 2020-01-20 LAB — 25(OH)D3 SERPL-MCNC: 12.4 NG/ML (ref 30–100)

## 2020-01-24 ENCOUNTER — APPOINTMENT (OUTPATIENT)
Dept: LAB | Age: 35
End: 2020-01-24
Attending: INTERNAL MEDICINE
Payer: COMMERCIAL

## 2020-01-24 ENCOUNTER — TELEPHONE (OUTPATIENT)
Dept: INTERNAL MEDICINE CLINIC | Facility: CLINIC | Age: 35
End: 2020-01-24

## 2020-01-24 DIAGNOSIS — R31.29 OTHER MICROSCOPIC HEMATURIA: ICD-10-CM

## 2020-01-24 LAB
BACTERIA UR QL AUTO: NEGATIVE /HPF
RBC #/AREA URNS AUTO: 1 /HPF
WBC #/AREA URNS AUTO: 1 /HPF

## 2020-01-24 PROCEDURE — 81015 MICROSCOPIC EXAM OF URINE: CPT

## 2020-01-24 NOTE — TELEPHONE ENCOUNTER
Dr Lan Valles, please advise. Patient at work now, but need the 1/23/2020 letter from the doctor about work. She said her work requires the Jose Espinal on the note. 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     Please respond t

## 2020-02-27 ENCOUNTER — TELEPHONE (OUTPATIENT)
Dept: INTERNAL MEDICINE CLINIC | Facility: CLINIC | Age: 35
End: 2020-02-27

## 2020-02-27 NOTE — TELEPHONE ENCOUNTER
Spoke with patient, (  verified ) confirmed the appt for tomorrow at 10am with Merna      Patient verbalizes understanding and agrees

## 2020-02-27 NOTE — TELEPHONE ENCOUNTER
[facility] that leaves her unable to walk. Sched for 2/28 but Dr. Citlaly Smith will be out of the office that day, so she will not be seen at that time. Please advise patient.

## 2020-02-28 ENCOUNTER — OFFICE VISIT (OUTPATIENT)
Dept: INTERNAL MEDICINE CLINIC | Facility: CLINIC | Age: 35
End: 2020-02-28
Payer: COMMERCIAL

## 2020-02-28 ENCOUNTER — APPOINTMENT (OUTPATIENT)
Dept: LAB | Facility: HOSPITAL | Age: 35
End: 2020-02-28
Attending: PHYSICIAN ASSISTANT
Payer: COMMERCIAL

## 2020-02-28 VITALS
DIASTOLIC BLOOD PRESSURE: 80 MMHG | RESPIRATION RATE: 16 BRPM | HEIGHT: 66 IN | SYSTOLIC BLOOD PRESSURE: 120 MMHG | HEART RATE: 80 BPM | WEIGHT: 172 LBS | BODY MASS INDEX: 27.64 KG/M2

## 2020-02-28 DIAGNOSIS — R20.0 NUMBNESS AND TINGLING OF FOOT: ICD-10-CM

## 2020-02-28 DIAGNOSIS — M79.671 RIGHT FOOT PAIN: ICD-10-CM

## 2020-02-28 DIAGNOSIS — R20.2 NUMBNESS AND TINGLING OF FOOT: Primary | ICD-10-CM

## 2020-02-28 DIAGNOSIS — R20.2 NUMBNESS AND TINGLING OF FOOT: ICD-10-CM

## 2020-02-28 DIAGNOSIS — R20.0 NUMBNESS AND TINGLING OF FOOT: Primary | ICD-10-CM

## 2020-02-28 LAB
ALBUMIN SERPL-MCNC: 3.7 G/DL (ref 3.4–5)
ALBUMIN/GLOB SERPL: 1 {RATIO} (ref 1–2)
ALP LIVER SERPL-CCNC: 52 U/L (ref 37–98)
ALT SERPL-CCNC: 30 U/L (ref 13–56)
ANION GAP SERPL CALC-SCNC: 4 MMOL/L (ref 0–18)
AST SERPL-CCNC: 17 U/L (ref 15–37)
BILIRUB SERPL-MCNC: 0.3 MG/DL (ref 0.1–2)
BUN BLD-MCNC: 8 MG/DL (ref 7–18)
BUN/CREAT SERPL: 12.3 (ref 10–20)
CALCIUM BLD-MCNC: 8.9 MG/DL (ref 8.5–10.1)
CHLORIDE SERPL-SCNC: 104 MMOL/L (ref 98–112)
CO2 SERPL-SCNC: 31 MMOL/L (ref 21–32)
CREAT BLD-MCNC: 0.65 MG/DL (ref 0.55–1.02)
GLOBULIN PLAS-MCNC: 3.8 G/DL (ref 2.8–4.4)
GLUCOSE BLD-MCNC: 79 MG/DL (ref 70–99)
HAV IGM SER QL: 2 MG/DL (ref 1.6–2.6)
M PROTEIN MFR SERPL ELPH: 7.5 G/DL (ref 6.4–8.2)
OSMOLALITY SERPL CALC.SUM OF ELEC: 285 MOSM/KG (ref 275–295)
PATIENT FASTING Y/N/NP: NO
POTASSIUM SERPL-SCNC: 3.9 MMOL/L (ref 3.5–5.1)
SODIUM SERPL-SCNC: 139 MMOL/L (ref 136–145)
URATE SERPL-MCNC: 2.6 MG/DL (ref 2.6–6)
VIT B12 SERPL-MCNC: 465 PG/ML (ref 193–986)

## 2020-02-28 PROCEDURE — 84550 ASSAY OF BLOOD/URIC ACID: CPT

## 2020-02-28 PROCEDURE — 36415 COLL VENOUS BLD VENIPUNCTURE: CPT

## 2020-02-28 PROCEDURE — 82607 VITAMIN B-12: CPT

## 2020-02-28 PROCEDURE — 99213 OFFICE O/P EST LOW 20 MIN: CPT | Performed by: PHYSICIAN ASSISTANT

## 2020-02-28 PROCEDURE — 83735 ASSAY OF MAGNESIUM: CPT

## 2020-02-28 PROCEDURE — 80053 COMPREHEN METABOLIC PANEL: CPT

## 2020-02-28 NOTE — PROGRESS NOTES
HPI:    Patient ID: Manjinder Zuleta is a 37year old female. HPI   Patient presents with right foot tingling, numbness and pain for a month now, worsening. Feels her foot is tender and stuff, has pain with heels. Wears crocs with relief.  Had this pain i \  Family History   Problem Relation Age of Onset   • Diabetes Other    • Other (Other[other]) Mother         allergies, asthma, eczema   • Diabetes Mother    • Hypertension Brother    • Other (Other[other]) Brother         asthma, eczema         PHYSICA Metabolic Panel (14) [E]      Uric Acid, Serum [E]    No follow-ups on file.     Meds This Visit:  Requested Prescriptions      No prescriptions requested or ordered in this encounter       Imaging & Referrals:  None         #[id number]

## 2020-04-12 ENCOUNTER — PATIENT MESSAGE (OUTPATIENT)
Dept: INTERNAL MEDICINE CLINIC | Facility: CLINIC | Age: 35
End: 2020-04-12

## 2020-04-13 ENCOUNTER — NURSE TRIAGE (OUTPATIENT)
Dept: INTERNAL MEDICINE CLINIC | Facility: CLINIC | Age: 35
End: 2020-04-13

## 2020-04-13 ENCOUNTER — TELEPHONE (OUTPATIENT)
Dept: INTERNAL MEDICINE CLINIC | Facility: CLINIC | Age: 35
End: 2020-04-13

## 2020-04-13 DIAGNOSIS — T14.8XXA OPEN WOUND: ICD-10-CM

## 2020-04-13 DIAGNOSIS — S80.00XA CONTUSION OF KNEE, UNSPECIFIED LATERALITY, INITIAL ENCOUNTER: Primary | ICD-10-CM

## 2020-04-13 DIAGNOSIS — J45.20 MILD INTERMITTENT ASTHMA WITHOUT COMPLICATION: ICD-10-CM

## 2020-04-13 PROCEDURE — 99212 OFFICE O/P EST SF 10 MIN: CPT | Performed by: INTERNAL MEDICINE

## 2020-04-13 NOTE — TELEPHONE ENCOUNTER
RN assigned to Acute encounters, please call patient and triage regarding MyChart message copied here. MyChart message response sent in original MyChart encounter, per department process. Note attachments sent by pt (visible in media tab).   ----- Message f

## 2020-04-13 NOTE — TELEPHONE ENCOUNTER
Virtual Telephone Check-In    Alondra Vital verbally consents to a Virtual/Telephone Check-In visit on 04/13/20. Patient understands and accepts financial responsibility for any deductible, co-insurance and/or co-pays associated with this service.

## 2020-04-13 NOTE — TELEPHONE ENCOUNTER
Action Requested: Summary for Provider     []  Critical Lab, Recommendations Needed  [] Need Additional Advice  []   FYI    []   Need Orders  [] Need Medications Sent to Pharmacy  []  Other     SUMMARY: pt sustained injuries (bruises and scrapes) to both l

## 2020-04-13 NOTE — TELEPHONE ENCOUNTER
From: Rhonda Leos  To: Kaylin Chirinos MD  Sent: 4/12/2020 11:59 PM CDT  Subject: Non-Urgent Sybil Reid,    I injured myself tonight in-line skating with my kids.  The first picture is my left leg and the second picture is my rig

## 2020-06-09 ENCOUNTER — TELEPHONE (OUTPATIENT)
Dept: INTERNAL MEDICINE CLINIC | Facility: CLINIC | Age: 35
End: 2020-06-09

## 2020-06-09 NOTE — TELEPHONE ENCOUNTER
Pt stated that some one called her for a Travel screen and for additional questions. Pt stated that she is having some vaginal discharge and a odor so she believe she has BV. Pt unable to come in today but she will come tomorrow.      Future Appointments

## 2020-06-10 ENCOUNTER — OFFICE VISIT (OUTPATIENT)
Dept: INTERNAL MEDICINE CLINIC | Facility: CLINIC | Age: 35
End: 2020-06-10
Payer: COMMERCIAL

## 2020-06-10 ENCOUNTER — LAB ENCOUNTER (OUTPATIENT)
Dept: LAB | Facility: HOSPITAL | Age: 35
End: 2020-06-10
Attending: NURSE PRACTITIONER
Payer: COMMERCIAL

## 2020-06-10 VITALS
SYSTOLIC BLOOD PRESSURE: 103 MMHG | HEIGHT: 66 IN | DIASTOLIC BLOOD PRESSURE: 69 MMHG | BODY MASS INDEX: 27.48 KG/M2 | HEART RATE: 81 BPM | WEIGHT: 171 LBS

## 2020-06-10 DIAGNOSIS — N89.8 VAGINAL DISCHARGE: ICD-10-CM

## 2020-06-10 DIAGNOSIS — N89.8 VAGINAL ODOR: ICD-10-CM

## 2020-06-10 DIAGNOSIS — N89.8 VAGINAL ODOR: Primary | ICD-10-CM

## 2020-06-10 PROCEDURE — 87389 HIV-1 AG W/HIV-1&-2 AB AG IA: CPT

## 2020-06-10 PROCEDURE — 87491 CHLMYD TRACH DNA AMP PROBE: CPT

## 2020-06-10 PROCEDURE — 36415 COLL VENOUS BLD VENIPUNCTURE: CPT

## 2020-06-10 PROCEDURE — 84703 CHORIONIC GONADOTROPIN ASSAY: CPT

## 2020-06-10 PROCEDURE — 87591 N.GONORRHOEAE DNA AMP PROB: CPT

## 2020-06-10 PROCEDURE — 86780 TREPONEMA PALLIDUM: CPT

## 2020-06-10 PROCEDURE — 99213 OFFICE O/P EST LOW 20 MIN: CPT | Performed by: NURSE PRACTITIONER

## 2020-06-10 NOTE — ASSESSMENT & PLAN NOTE
A/P-37year-old -American female who works as a . She currently is sexually active not using any birth control. Her last monthly period was last Tuesday.   She has a 8year-old and a 11year-old at home and she is trying to get pre

## 2020-06-10 NOTE — PROGRESS NOTES
HPI:    Patient ID: Matthew Cesar is a 37year old female. LMP last Tuesday. Patient is sexually active. Currently patient is not using birth control because she is trying to get pregnant. Patient has 8year old and 11year old.  Patient is a probation of moist.   Eyes: Pupils are equal, round, and reactive to light. Cardiovascular: Normal rate, regular rhythm, normal heart sounds and intact distal pulses. Exam reveals no gallop and no friction rub. No murmur heard.   Pulmonary/Chest: Effort normal and get pregnant. Last week she had some vaginal itching and she used 1 day Monistat. Now she is having a foul order whitish discharge. Patient states she had this back in college and it was bacterial vaginosis. Patient is also requesting a note for work.

## 2020-06-11 ENCOUNTER — TELEPHONE (OUTPATIENT)
Dept: INTERNAL MEDICINE CLINIC | Facility: CLINIC | Age: 35
End: 2020-06-11

## 2020-06-11 RX ORDER — METRONIDAZOLE 500 MG/1
500 TABLET ORAL 2 TIMES DAILY
Qty: 14 TABLET | Refills: 0 | Status: SHIPPED | OUTPATIENT
Start: 2020-06-11 | End: 2020-06-18

## 2020-06-11 NOTE — PROGRESS NOTES
Manjinder Single  Your pregnancy test is negative. All your STD test are negative. HIV is negative. Positive for bacteria vaginosis- message left on your phone and medication  Flagyl 500 mg po BID x 7 days ordered.  ( No alcohol while taking this medicati

## 2020-06-11 NOTE — TELEPHONE ENCOUNTER
Patient called no answer. Message left to start antibiotic  Flagyl 500 mg po BID x 7 days. No alcohol while taking this medication.     Marilynn Garcia, ANP

## 2020-07-06 ENCOUNTER — TELEPHONE (OUTPATIENT)
Dept: OBGYN CLINIC | Facility: CLINIC | Age: 35
End: 2020-07-06

## 2020-07-06 DIAGNOSIS — Z32.00 PREGNANCY EXAMINATION OR TEST, PREGNANCY UNCONFIRMED: Primary | ICD-10-CM

## 2020-07-06 NOTE — TELEPHONE ENCOUNTER
Scheduled via [facility] to leave on schedule?         Appointment For: Krzysztof Harper ([id number])   Visit Type: Sonia Barfield ([id number])      7/8/2020     9:00 AM  20 mins. Hue Marcelino DO    [facility]      Patient Comments:   Positive pregnanc

## 2020-07-07 NOTE — TELEPHONE ENCOUNTER
Informed pt appt for 7/8/2020 needs to be canceled. Pt became very angry and started yelling on the phone. Pt was yelling that she needs letter for work to keep her off the streets since she is a .  Asked pt twice to stop yelling or phone c

## 2020-07-07 NOTE — TELEPHONE ENCOUNTER
Pt very persistent about keeping about, LMP 6/3. Per pt is a  and needs a note asap to keep her off the streets.  Please advise

## 2020-07-07 NOTE — TELEPHONE ENCOUNTER
Pt informed an order was placed for a pregnancy test and once we have the results we can write the letter she needs. Pt asked if she has to wait for her appt to do the blood work. Pt advised she does not need to wait.

## 2020-07-23 ENCOUNTER — NURSE ONLY (OUTPATIENT)
Dept: OBGYN CLINIC | Facility: CLINIC | Age: 35
End: 2020-07-23
Payer: COMMERCIAL

## 2020-07-23 DIAGNOSIS — Z34.81 ENCOUNTER FOR SUPERVISION OF OTHER NORMAL PREGNANCY IN FIRST TRIMESTER: Primary | ICD-10-CM

## 2020-07-23 RX ORDER — CHOLECALCIFEROL (VITAMIN D3) 25 MCG
1 TABLET,CHEWABLE ORAL DAILY
COMMUNITY
End: 2021-09-16

## 2020-07-23 NOTE — PROGRESS NOTES
Pt seen for OBN appt today with no complaints. Normal PN labs, qual and 1 hr gtt ordered. Pt advised all labs must be completed and resulted prior to MD appt. NPN appt with MD scheduled with ASHWIN on 8/5/2020.     Partner's name is Ann Gibson contact #[id number] MCV less than 80:  No   Neural tube defect (Meningomyelocele, Spina bifida, or Anencephaly):  No   Congenital heart defect:  No   Down syndrome:  No   Jacinto-Sachs (Ashkenazi Tenriism, [facility]):  No   Canavan disease (Ashkenazi Tenriism):  No   Fam

## 2020-07-24 ENCOUNTER — PATIENT MESSAGE (OUTPATIENT)
Dept: INTERNAL MEDICINE CLINIC | Facility: CLINIC | Age: 35
End: 2020-07-24

## 2020-07-24 DIAGNOSIS — E55.9 VITAMIN D DEFICIENCY: Primary | ICD-10-CM

## 2020-07-24 NOTE — TELEPHONE ENCOUNTER
From: Chico Aguilar  To: Griselda Tapia MD  Sent: 7/24/2020 10:02 AM CDT  Subject: Visit Chauncey Liu,    I am 7 weeks pregnant with my 3rd child.  I was wondering how much longer do I have to continue taking the weekly vitamin

## 2020-07-24 NOTE — TELEPHONE ENCOUNTER
Result note reviewed. Patient was to have repeat vitamin D level in 12 weeks. Order was not placed. Lab order placed and mychart reply sent to patient.

## 2020-07-29 ENCOUNTER — LAB ENCOUNTER (OUTPATIENT)
Dept: LAB | Facility: HOSPITAL | Age: 35
End: 2020-07-29
Attending: INTERNAL MEDICINE
Payer: COMMERCIAL

## 2020-07-29 DIAGNOSIS — Z34.81 ENCOUNTER FOR SUPERVISION OF OTHER NORMAL PREGNANCY IN FIRST TRIMESTER: ICD-10-CM

## 2020-07-29 DIAGNOSIS — E55.9 VITAMIN D DEFICIENCY: ICD-10-CM

## 2020-07-29 LAB
ANTIBODY SCREEN: NEGATIVE
BASOPHILS # BLD AUTO: 0.04 X10(3) UL (ref 0–0.2)
BASOPHILS NFR BLD AUTO: 0.4 %
DEPRECATED RDW RBC AUTO: 41.7 FL (ref 35.1–46.3)
EOSINOPHIL # BLD AUTO: 0.11 X10(3) UL (ref 0–0.7)
EOSINOPHIL NFR BLD AUTO: 1.2 %
ERYTHROCYTE [DISTWIDTH] IN BLOOD BY AUTOMATED COUNT: 12.8 % (ref 11–15)
GLUCOSE 1H P GLC SERPL-MCNC: 82 MG/DL
HBV SURFACE AG SER-ACNC: <0.1 [IU]/L
HBV SURFACE AG SERPL QL IA: NONREACTIVE
HCG SERPL QL: POSITIVE
HCT VFR BLD AUTO: 37.6 % (ref 35–48)
HGB BLD-MCNC: 12.2 G/DL (ref 12–16)
IMM GRANULOCYTES # BLD AUTO: 0.02 X10(3) UL (ref 0–1)
IMM GRANULOCYTES NFR BLD: 0.2 %
LYMPHOCYTES # BLD AUTO: 1.74 X10(3) UL (ref 1–4)
LYMPHOCYTES NFR BLD AUTO: 19.2 %
MCH RBC QN AUTO: 28.7 PG (ref 26–34)
MCHC RBC AUTO-ENTMCNC: 32.4 G/DL (ref 31–37)
MCV RBC AUTO: 88.5 FL (ref 80–100)
MONOCYTES # BLD AUTO: 0.91 X10(3) UL (ref 0.1–1)
MONOCYTES NFR BLD AUTO: 10 %
NEUTROPHILS # BLD AUTO: 6.25 X10 (3) UL (ref 1.5–7.7)
NEUTROPHILS # BLD AUTO: 6.25 X10(3) UL (ref 1.5–7.7)
NEUTROPHILS NFR BLD AUTO: 69 %
PLATELET # BLD AUTO: 271 10(3)UL (ref 150–450)
RBC # BLD AUTO: 4.25 X10(6)UL (ref 3.8–5.3)
RH BLOOD TYPE: POSITIVE
RUBV IGG SER QL: POSITIVE
RUBV IGG SER-ACNC: 133.4 IU/ML (ref 10–?)
WBC # BLD AUTO: 9.1 X10(3) UL (ref 4–11)

## 2020-07-29 PROCEDURE — 36415 COLL VENOUS BLD VENIPUNCTURE: CPT

## 2020-07-29 PROCEDURE — 86780 TREPONEMA PALLIDUM: CPT

## 2020-07-29 PROCEDURE — 86762 RUBELLA ANTIBODY: CPT

## 2020-07-29 PROCEDURE — 87340 HEPATITIS B SURFACE AG IA: CPT

## 2020-07-29 PROCEDURE — 85025 COMPLETE CBC W/AUTO DIFF WBC: CPT

## 2020-07-29 PROCEDURE — 87086 URINE CULTURE/COLONY COUNT: CPT

## 2020-07-29 PROCEDURE — 86850 RBC ANTIBODY SCREEN: CPT

## 2020-07-29 PROCEDURE — 86900 BLOOD TYPING SEROLOGIC ABO: CPT

## 2020-07-29 PROCEDURE — 86901 BLOOD TYPING SEROLOGIC RH(D): CPT

## 2020-07-29 PROCEDURE — 87389 HIV-1 AG W/HIV-1&-2 AB AG IA: CPT

## 2020-07-29 PROCEDURE — 82950 GLUCOSE TEST: CPT

## 2020-07-29 PROCEDURE — 84703 CHORIONIC GONADOTROPIN ASSAY: CPT

## 2020-07-29 PROCEDURE — 82306 VITAMIN D 25 HYDROXY: CPT

## 2020-07-31 LAB
25(OH)D3 SERPL-MCNC: 37 NG/ML (ref 30–100)
T PALLIDUM AB SER QL: NEGATIVE

## 2020-08-02 ENCOUNTER — PATIENT MESSAGE (OUTPATIENT)
Dept: INTERNAL MEDICINE CLINIC | Facility: CLINIC | Age: 35
End: 2020-08-02

## 2020-08-02 NOTE — TELEPHONE ENCOUNTER
From: Rhonda Leos  To: Kaylin Chirinos MD  Sent: 8/2/2020 1:19 AM CDT  Subject: Non-Urgent Medical Question    I seen your message about the Vitamin D maintenance dosage. Thank you.

## 2020-08-05 ENCOUNTER — INITIAL PRENATAL (OUTPATIENT)
Dept: OBGYN CLINIC | Facility: CLINIC | Age: 35
End: 2020-08-05
Payer: COMMERCIAL

## 2020-08-05 VITALS
BODY MASS INDEX: 29 KG/M2 | DIASTOLIC BLOOD PRESSURE: 71 MMHG | HEART RATE: 85 BPM | SYSTOLIC BLOOD PRESSURE: 106 MMHG | WEIGHT: 181.63 LBS

## 2020-08-05 DIAGNOSIS — Z34.91 ENCOUNTER FOR SUPERVISION OF NORMAL PREGNANCY IN FIRST TRIMESTER, UNSPECIFIED GRAVIDITY: Primary | ICD-10-CM

## 2020-08-05 PROBLEM — O09.521 MULTIGRAVIDA OF ADVANCED MATERNAL AGE IN FIRST TRIMESTER: Status: ACTIVE | Noted: 2020-08-05

## 2020-08-05 PROBLEM — O09.521 MULTIGRAVIDA OF ADVANCED MATERNAL AGE IN FIRST TRIMESTER (HCC): Status: ACTIVE | Noted: 2020-08-05

## 2020-08-05 LAB
MULTISTIX LOT#: 1044 NUMERIC
PH, URINE: 8 (ref 4.5–8)
SPECIFIC GRAVITY: 1.01 (ref 1–1.03)
UROBILINOGEN,SEMI-QN: 0 MG/DL (ref 0–1.9)

## 2020-08-05 PROCEDURE — 3074F SYST BP LT 130 MM HG: CPT | Performed by: OBSTETRICS & GYNECOLOGY

## 2020-08-05 PROCEDURE — 76815 OB US LIMITED FETUS(S): CPT | Performed by: OBSTETRICS & GYNECOLOGY

## 2020-08-05 PROCEDURE — 3078F DIAST BP <80 MM HG: CPT | Performed by: OBSTETRICS & GYNECOLOGY

## 2020-08-05 PROCEDURE — 81002 URINALYSIS NONAUTO W/O SCOPE: CPT | Performed by: OBSTETRICS & GYNECOLOGY

## 2020-08-05 RX ORDER — ERGOCALCIFEROL 1.25 MG/1
50000 CAPSULE ORAL WEEKLY
COMMUNITY
Start: 2020-07-18 | End: 2020-09-30

## 2020-08-06 NOTE — PROGRESS NOTES
Neg / neg 12/19. Undecided regarding FTS. Wishes for level 2 u/s. Recent GC/chl negative.  RTC 4 wks

## 2020-09-02 ENCOUNTER — ROUTINE PRENATAL (OUTPATIENT)
Dept: OBGYN CLINIC | Facility: CLINIC | Age: 35
End: 2020-09-02
Payer: COMMERCIAL

## 2020-09-02 ENCOUNTER — TELEPHONE (OUTPATIENT)
Dept: OBGYN CLINIC | Facility: CLINIC | Age: 35
End: 2020-09-02

## 2020-09-02 VITALS
DIASTOLIC BLOOD PRESSURE: 62 MMHG | BODY MASS INDEX: 31 KG/M2 | SYSTOLIC BLOOD PRESSURE: 116 MMHG | HEART RATE: 101 BPM | WEIGHT: 194.63 LBS

## 2020-09-02 DIAGNOSIS — Z34.82 ENCOUNTER FOR SUPERVISION OF OTHER NORMAL PREGNANCY IN SECOND TRIMESTER: Primary | ICD-10-CM

## 2020-09-02 DIAGNOSIS — Z36.9 FIRST TRIMESTER SCREENING: Primary | ICD-10-CM

## 2020-09-02 LAB
APPEARANCE: CLEAR
MULTISTIX LOT#: NORMAL NUMERIC
PH, URINE: 6.5 (ref 4.5–8)
SPECIFIC GRAVITY: 1.01 (ref 1–1.03)
URINE-COLOR: YELLOW
UROBILINOGEN,SEMI-QN: 0.2 MG/DL (ref 0–1.9)

## 2020-09-02 PROCEDURE — 3074F SYST BP LT 130 MM HG: CPT | Performed by: OBSTETRICS & GYNECOLOGY

## 2020-09-02 PROCEDURE — 3078F DIAST BP <80 MM HG: CPT | Performed by: OBSTETRICS & GYNECOLOGY

## 2020-09-02 PROCEDURE — 81002 URINALYSIS NONAUTO W/O SCOPE: CPT | Performed by: OBSTETRICS & GYNECOLOGY

## 2020-09-02 NOTE — TELEPHONE ENCOUNTER
Spoke to pt. Advised FTS has been placed. Pt was driving will sent number to schedule appt via [employer]. Verbalized understandings.     Communication with Dominique Ludwig pt aware MFM might not be able to see her, due to how far along she is

## 2020-09-02 NOTE — PROGRESS NOTES
Outpatient Maternal-Fetal Medicine Consultation    Dear Dr. Misty Manzano,    Thank you for requesting ultrasound evaluation and maternal fetal medicine consultation on your patient Darylene Flash.   As you are aware she is a 38year old female with a [place] pre Desogestrel-Ethinyl Estradiol (DESOGEN) 0.15-30 MG-MCG Oral Tab, Take 1 tablet by mouth daily.  (Patient not taking: Reported on 8/5/2020 ), Disp: 3 Package, Rfl: 3  •  Albuterol Sulfate HFA (PROAIR HFA) 108 (90 BASE) MCG/ACT Inhalation Aero Soln, Inhale 4 and preeclampsia; hence, no further significant alterations in obstetric care are advised.     Medical Complications    Women 28years of age or older can expect to experience two to three fold higher rates of hospitalization,  delivery, and pregnan and older. Fetal Malformations    Cardiac malformations, clubfoot, and diaphragmatic hernia appear to occur with increased frequency in offspring of older women.  These abnormalities are structural and unrelated to aneuploidy, thus they would not be dete women experience improved asthma symptoms, one third are unchanged and one third have worsening asthma during pregnancy. [place] may affect the outcome of pregnancy.   There is a small but significant increase in complications of pregnancy in asthmatic wome

## 2020-09-02 NOTE — PROGRESS NOTES
No issues. Now wants genetics but didn't call to organize referral. Discussed timing and may need quad screen if we can't get her with MFM sooner.

## 2020-09-03 ENCOUNTER — HOSPITAL ENCOUNTER (OUTPATIENT)
Dept: PERINATAL CARE | Facility: HOSPITAL | Age: 35
Discharge: HOME | End: 2020-09-03
Attending: OBSTETRICS & GYNECOLOGY
Payer: MEDICAID

## 2020-09-03 VITALS
BODY MASS INDEX: 31 KG/M2 | HEART RATE: 102 BPM | WEIGHT: 194 LBS | SYSTOLIC BLOOD PRESSURE: 114 MMHG | DIASTOLIC BLOOD PRESSURE: 67 MMHG

## 2020-09-03 DIAGNOSIS — O09.521 MULTIGRAVIDA OF ADVANCED MATERNAL AGE IN FIRST TRIMESTER: ICD-10-CM

## 2020-09-03 DIAGNOSIS — O09.521 AMA (ADVANCED MATERNAL AGE) MULTIGRAVIDA 35+, FIRST TRIMESTER: ICD-10-CM

## 2020-09-03 DIAGNOSIS — Z36.9 FIRST TRIMESTER SCREENING: Primary | ICD-10-CM

## 2020-09-03 DIAGNOSIS — Z36.9 FIRST TRIMESTER SCREENING: ICD-10-CM

## 2020-09-03 DIAGNOSIS — J45.20 MILD INTERMITTENT ASTHMA WITHOUT COMPLICATION: ICD-10-CM

## 2020-09-03 PROCEDURE — 76813 OB US NUCHAL MEAS 1 GEST: CPT | Performed by: OBSTETRICS & GYNECOLOGY

## 2020-09-03 PROCEDURE — 99243 OFF/OP CNSLTJ NEW/EST LOW 30: CPT | Performed by: OBSTETRICS & GYNECOLOGY

## 2020-09-11 ENCOUNTER — TELEPHONE (OUTPATIENT)
Dept: OBGYN CLINIC | Facility: CLINIC | Age: 35
End: 2020-09-11

## 2020-09-11 ENCOUNTER — TELEPHONE (OUTPATIENT)
Dept: PERINATAL CARE | Facility: HOSPITAL | Age: 35
End: 2020-09-11

## 2020-09-11 NOTE — TELEPHONE ENCOUNTER
HARMONY screening results obt  Reviewed by MD Corina Long  Low risk for  Trisomy 21  1:80155 risk  Low risk for Trisomy 18/13  1:46914 risk    Fetal sex:  Male    Pt states understanding  Copy of results sent for scanning into pt record

## 2020-09-29 ENCOUNTER — TELEPHONE (OUTPATIENT)
Dept: INTERNAL MEDICINE CLINIC | Facility: CLINIC | Age: 35
End: 2020-09-29

## 2020-09-29 NOTE — TELEPHONE ENCOUNTER
Pt scheduled in person appt via [employer] for \"nasal congestion and asthma\" please advise

## 2020-09-29 NOTE — TELEPHONE ENCOUNTER
Patient called back. Patient has a history of asthma and ran out of her Albuterol inhaler. Per patient, she has mild shortness of breath and difficulty breathing. Patient also complaining of nasal congestion . Patient able to speak to RN in full sentences. No acute respiratory distress noted. Patient is four months pregnant sos she is not sure if shortness of breath and difficulty breathing is being worsened by pregnancy/   Patient scheduled to see Dr. Jc Bey in office tomorrow . Dr. Jc Bey:   Please advise if you want to keep office visit or change visit to virtual visit.

## 2020-09-30 ENCOUNTER — OFFICE VISIT (OUTPATIENT)
Dept: INTERNAL MEDICINE CLINIC | Facility: CLINIC | Age: 35
End: 2020-09-30
Payer: MEDICAID

## 2020-09-30 ENCOUNTER — ROUTINE PRENATAL (OUTPATIENT)
Dept: OBGYN CLINIC | Facility: CLINIC | Age: 35
End: 2020-09-30
Payer: MEDICAID

## 2020-09-30 VITALS
HEIGHT: 66 IN | OXYGEN SATURATION: 99 % | HEART RATE: 103 BPM | DIASTOLIC BLOOD PRESSURE: 76 MMHG | BODY MASS INDEX: 33.11 KG/M2 | WEIGHT: 206 LBS | SYSTOLIC BLOOD PRESSURE: 117 MMHG | TEMPERATURE: 99 F

## 2020-09-30 VITALS
HEART RATE: 99 BPM | DIASTOLIC BLOOD PRESSURE: 72 MMHG | BODY MASS INDEX: 33.17 KG/M2 | SYSTOLIC BLOOD PRESSURE: 121 MMHG | HEIGHT: 66 IN | WEIGHT: 206.38 LBS

## 2020-09-30 DIAGNOSIS — J45.21 MILD INTERMITTENT ASTHMA WITH ACUTE EXACERBATION: Primary | ICD-10-CM

## 2020-09-30 DIAGNOSIS — Z34.82 ENCOUNTER FOR SUPERVISION OF OTHER NORMAL PREGNANCY IN SECOND TRIMESTER: Primary | ICD-10-CM

## 2020-09-30 DIAGNOSIS — Z91.09 ENVIRONMENTAL ALLERGIES: ICD-10-CM

## 2020-09-30 DIAGNOSIS — Z3A.17 17 WEEKS GESTATION OF PREGNANCY: ICD-10-CM

## 2020-09-30 PROBLEM — N89.8 VAGINAL DISCHARGE: Status: RESOLVED | Noted: 2020-06-10 | Resolved: 2020-09-30

## 2020-09-30 PROBLEM — J01.00 ACUTE NON-RECURRENT MAXILLARY SINUSITIS: Status: RESOLVED | Noted: 2019-12-13 | Resolved: 2020-09-30

## 2020-09-30 PROCEDURE — 3078F DIAST BP <80 MM HG: CPT | Performed by: OBSTETRICS & GYNECOLOGY

## 2020-09-30 PROCEDURE — 0502F SUBSEQUENT PRENATAL CARE: CPT | Performed by: OBSTETRICS & GYNECOLOGY

## 2020-09-30 PROCEDURE — 3074F SYST BP LT 130 MM HG: CPT | Performed by: OBSTETRICS & GYNECOLOGY

## 2020-09-30 PROCEDURE — 3078F DIAST BP <80 MM HG: CPT | Performed by: INTERNAL MEDICINE

## 2020-09-30 PROCEDURE — 81002 URINALYSIS NONAUTO W/O SCOPE: CPT | Performed by: OBSTETRICS & GYNECOLOGY

## 2020-09-30 PROCEDURE — 3008F BODY MASS INDEX DOCD: CPT | Performed by: INTERNAL MEDICINE

## 2020-09-30 PROCEDURE — 3008F BODY MASS INDEX DOCD: CPT | Performed by: OBSTETRICS & GYNECOLOGY

## 2020-09-30 PROCEDURE — 99214 OFFICE O/P EST MOD 30 MIN: CPT | Performed by: INTERNAL MEDICINE

## 2020-09-30 PROCEDURE — 3074F SYST BP LT 130 MM HG: CPT | Performed by: INTERNAL MEDICINE

## 2020-09-30 RX ORDER — LORATADINE 10 MG/1
10 TABLET ORAL DAILY
Qty: 30 TABLET | Refills: 1 | Status: SHIPPED | OUTPATIENT
Start: 2020-09-30 | End: 2020-12-23

## 2020-09-30 RX ORDER — ALBUTEROL SULFATE 90 UG/1
AEROSOL, METERED RESPIRATORY (INHALATION)
Qty: 1 INHALER | Refills: 1 | Status: SHIPPED | OUTPATIENT
Start: 2020-09-30 | End: 2020-12-23

## 2020-09-30 RX ORDER — ALBUTEROL SULFATE 90 UG/1
2 AEROSOL, METERED RESPIRATORY (INHALATION) EVERY 4 HOURS PRN
Qty: 1 INHALER | Refills: 6 | Status: SHIPPED | OUTPATIENT
Start: 2020-09-30 | End: 2021-09-30

## 2020-10-01 NOTE — PROGRESS NOTES
HPI:    Patient ID: Sly Pereyra is a 38year old female.     HPI     17 wks pregnant complaint of shortness of breath  Using her son's inhaler 3 times per day  Not sure if she is short of breath due to her weight gain from pregnancy  She stays active  H palpitations and leg swelling. Gastrointestinal: Negative for abdominal distention and abdominal pain. Genitourinary: Negative for difficulty urinating. Allergic/Immunologic: Positive for environmental allergies.          Current Outpatient Medication Currently      Alcohol/week: 0.0 standard drinks    Drug use: Never       PHYSICAL EXAM:    Physical Exam   Constitutional: She appears well-developed.    HENT:   Right Ear: External ear normal.   Left Ear: External ear normal.   Mouth/Throat: Oropharynx is Wheezing.      Patient not taking: Reported on 9/30/2020       Imaging & Referrals:  ALLERGY - INTERNAL        [facility]#[id number]

## 2020-10-05 ENCOUNTER — TELEPHONE (OUTPATIENT)
Dept: INTERNAL MEDICINE CLINIC | Facility: CLINIC | Age: 35
End: 2020-10-05

## 2020-10-05 NOTE — TELEPHONE ENCOUNTER
Scheduled   •  Budesonide (RHINOCORT AQUA) 32 MCG/ACT Nasal Suspension, 2 sprays by Nasal route daily. (Patient not taking: Reported on 9/30/2020 ), Disp: 1 Bottle, Rfl: 3    Pharmacy note: Plan does not cover this medication.  Please call plan at [phone] to initi

## 2020-10-28 ENCOUNTER — ROUTINE PRENATAL (OUTPATIENT)
Dept: OBGYN CLINIC | Facility: CLINIC | Age: 35
End: 2020-10-28
Payer: MEDICAID

## 2020-10-28 ENCOUNTER — HOSPITAL ENCOUNTER (OUTPATIENT)
Dept: PERINATAL CARE | Facility: HOSPITAL | Age: 35
Discharge: HOME | End: 2020-10-28
Attending: OBSTETRICS & GYNECOLOGY
Payer: MEDICAID

## 2020-10-28 VITALS
WEIGHT: 213 LBS | BODY MASS INDEX: 34 KG/M2 | HEART RATE: 91 BPM | SYSTOLIC BLOOD PRESSURE: 121 MMHG | DIASTOLIC BLOOD PRESSURE: 71 MMHG

## 2020-10-28 VITALS
DIASTOLIC BLOOD PRESSURE: 71 MMHG | BODY MASS INDEX: 35 KG/M2 | WEIGHT: 215 LBS | HEART RATE: 85 BPM | SYSTOLIC BLOOD PRESSURE: 118 MMHG

## 2020-10-28 DIAGNOSIS — O09.521 MULTIGRAVIDA OF ADVANCED MATERNAL AGE IN FIRST TRIMESTER: Primary | ICD-10-CM

## 2020-10-28 DIAGNOSIS — J45.30 MILD PERSISTENT ASTHMA WITHOUT COMPLICATION: ICD-10-CM

## 2020-10-28 DIAGNOSIS — Z34.82 ENCOUNTER FOR SUPERVISION OF OTHER NORMAL PREGNANCY IN SECOND TRIMESTER: Primary | ICD-10-CM

## 2020-10-28 DIAGNOSIS — O09.521 MULTIGRAVIDA OF ADVANCED MATERNAL AGE IN FIRST TRIMESTER: ICD-10-CM

## 2020-10-28 PROCEDURE — 3074F SYST BP LT 130 MM HG: CPT | Performed by: OBSTETRICS & GYNECOLOGY

## 2020-10-28 PROCEDURE — 99214 OFFICE O/P EST MOD 30 MIN: CPT | Performed by: OBSTETRICS & GYNECOLOGY

## 2020-10-28 PROCEDURE — 81002 URINALYSIS NONAUTO W/O SCOPE: CPT | Performed by: OBSTETRICS & GYNECOLOGY

## 2020-10-28 PROCEDURE — 0502F SUBSEQUENT PRENATAL CARE: CPT | Performed by: OBSTETRICS & GYNECOLOGY

## 2020-10-28 PROCEDURE — 76811 OB US DETAILED SNGL FETUS: CPT | Performed by: OBSTETRICS & GYNECOLOGY

## 2020-10-28 PROCEDURE — 3078F DIAST BP <80 MM HG: CPT | Performed by: OBSTETRICS & GYNECOLOGY

## 2020-10-28 NOTE — PROGRESS NOTES
Outpatient Maternal-Fetal Medicine Consultation    Dear Dr. Samra Cooper,    Thank you for requesting ultrasound evaluation and maternal fetal medicine consultation on your patient Mikey Bosworth.   As you are aware she is a 38year old female with a [place] pre Nasal Suspension, 2 sprays by Nasal route daily. (Patient not taking: Reported on 9/30/2020 ), Disp: 1 Bottle, Rfl: 3  •  loratadine (CLARITIN) 10 MG Oral Tab, Take 1 tablet (10 mg total) by mouth daily.  (Patient not taking: Reported on 9/30/2020 ), Disp: average, have a 15 to 20 percent increased risk of  mortality, preeclampsia,  deliveries, or low birth weight infants compared to non-asthmatic women, and that patients with more severe asthma have a 30 to 100 percent increased risk.  This s absent. Summary of Ultrasound findings: This is a Lowanda Boehringer pregnancy    The fetal measurements are consistent with established EDC. No gross ultrasound evidence of structural abnormalities are seen today. No minor markers for aneuploidy are seen.  Bryan Castellon

## 2020-10-28 NOTE — PROGRESS NOTES
Level 2 today. She stopped work as . Starting Masters degree work. Mehran Delacruz still works for [employer] he started with [employer].

## 2020-11-25 ENCOUNTER — LAB ENCOUNTER (OUTPATIENT)
Dept: LAB | Facility: HOSPITAL | Age: 35
End: 2020-11-25
Attending: OBSTETRICS & GYNECOLOGY
Payer: MEDICAID

## 2020-11-25 ENCOUNTER — ROUTINE PRENATAL (OUTPATIENT)
Dept: OBGYN CLINIC | Facility: CLINIC | Age: 35
End: 2020-11-25
Payer: MEDICAID

## 2020-11-25 VITALS
SYSTOLIC BLOOD PRESSURE: 111 MMHG | WEIGHT: 224.63 LBS | DIASTOLIC BLOOD PRESSURE: 67 MMHG | HEART RATE: 91 BPM | BODY MASS INDEX: 36 KG/M2

## 2020-11-25 DIAGNOSIS — Z34.82 ENCOUNTER FOR SUPERVISION OF OTHER NORMAL PREGNANCY IN SECOND TRIMESTER: Primary | ICD-10-CM

## 2020-11-25 DIAGNOSIS — Z34.82 ENCOUNTER FOR SUPERVISION OF OTHER NORMAL PREGNANCY IN SECOND TRIMESTER: ICD-10-CM

## 2020-11-25 PROCEDURE — 82950 GLUCOSE TEST: CPT

## 2020-11-25 PROCEDURE — 0502F SUBSEQUENT PRENATAL CARE: CPT | Performed by: OBSTETRICS & GYNECOLOGY

## 2020-11-25 PROCEDURE — 3078F DIAST BP <80 MM HG: CPT | Performed by: OBSTETRICS & GYNECOLOGY

## 2020-11-25 PROCEDURE — 85027 COMPLETE CBC AUTOMATED: CPT

## 2020-11-25 PROCEDURE — 3074F SYST BP LT 130 MM HG: CPT | Performed by: OBSTETRICS & GYNECOLOGY

## 2020-11-25 PROCEDURE — 81002 URINALYSIS NONAUTO W/O SCOPE: CPT | Performed by: OBSTETRICS & GYNECOLOGY

## 2020-11-25 PROCEDURE — 36415 COLL VENOUS BLD VENIPUNCTURE: CPT

## 2020-12-22 ENCOUNTER — TELEPHONE (OUTPATIENT)
Dept: OBGYN CLINIC | Facility: CLINIC | Age: 35
End: 2020-12-22

## 2020-12-22 NOTE — TELEPHONE ENCOUNTER
This pt has [employer] as of 12/1 and she is 28 wks pregnant. Please try to get prior auth for pts visits.

## 2020-12-22 NOTE — TELEPHONE ENCOUNTER
Received 3 office approvals for from [employer] from 12/22/20-03/22/2021, Authorization number [id number] these 3 visits are only for her OB visits with our group not MFM. These visits will cover her 28wk,30wk and 32wk appt.     Will send fax formed receive

## 2020-12-22 NOTE — TELEPHONE ENCOUNTER
Spoke to pt.  Advised her the health system does not take [place] she asked if the health system takes county care I advised her we dont, told her the only public aid plan the health system takes is [facility] and she will have to call her insura

## 2020-12-23 ENCOUNTER — ROUTINE PRENATAL (OUTPATIENT)
Dept: OBGYN CLINIC | Facility: CLINIC | Age: 35
End: 2020-12-23
Payer: MEDICAID

## 2020-12-23 VITALS
BODY MASS INDEX: 37 KG/M2 | WEIGHT: 228.63 LBS | HEART RATE: 105 BPM | SYSTOLIC BLOOD PRESSURE: 131 MMHG | DIASTOLIC BLOOD PRESSURE: 80 MMHG

## 2020-12-23 DIAGNOSIS — Z34.93 ENCOUNTER FOR SUPERVISION OF NORMAL PREGNANCY IN THIRD TRIMESTER, UNSPECIFIED GRAVIDITY: Primary | ICD-10-CM

## 2020-12-23 PROCEDURE — 0502F SUBSEQUENT PRENATAL CARE: CPT | Performed by: OBSTETRICS & GYNECOLOGY

## 2020-12-23 PROCEDURE — 3079F DIAST BP 80-89 MM HG: CPT | Performed by: OBSTETRICS & GYNECOLOGY

## 2020-12-23 PROCEDURE — 81002 URINALYSIS NONAUTO W/O SCOPE: CPT | Performed by: OBSTETRICS & GYNECOLOGY

## 2020-12-23 PROCEDURE — 3075F SYST BP GE 130 - 139MM HG: CPT | Performed by: OBSTETRICS & GYNECOLOGY

## 2020-12-23 RX ORDER — MELATONIN
325
Status: ON HOLD | COMMUNITY
End: 2021-03-10

## 2020-12-23 NOTE — TELEPHONE ENCOUNTER
Will sent form to scan    Called [place] at [phone] and spoke to YVONNE BAILON [facility], I advised her I did not receive information stating that if office visit requested to see  was approved or denied, but did receive approval for [id number].  Stated will have

## 2021-01-04 ENCOUNTER — HOSPITAL ENCOUNTER (OUTPATIENT)
Dept: PERINATAL CARE | Facility: HOSPITAL | Age: 36
Discharge: HOME | End: 2021-01-04
Attending: OBSTETRICS & GYNECOLOGY
Payer: MEDICAID

## 2021-01-04 VITALS
BODY MASS INDEX: 37 KG/M2 | DIASTOLIC BLOOD PRESSURE: 66 MMHG | WEIGHT: 228 LBS | HEART RATE: 94 BPM | SYSTOLIC BLOOD PRESSURE: 113 MMHG

## 2021-01-04 DIAGNOSIS — O09.521 MULTIGRAVIDA OF ADVANCED MATERNAL AGE IN FIRST TRIMESTER: ICD-10-CM

## 2021-01-04 DIAGNOSIS — J45.30 MILD PERSISTENT ASTHMA WITHOUT COMPLICATION: ICD-10-CM

## 2021-01-04 DIAGNOSIS — O09.521 MULTIGRAVIDA OF ADVANCED MATERNAL AGE IN FIRST TRIMESTER: Primary | ICD-10-CM

## 2021-01-04 PROCEDURE — 76816 OB US FOLLOW-UP PER FETUS: CPT | Performed by: OBSTETRICS & GYNECOLOGY

## 2021-01-04 PROCEDURE — 99213 OFFICE O/P EST LOW 20 MIN: CPT | Performed by: OBSTETRICS & GYNECOLOGY

## 2021-01-04 NOTE — PROGRESS NOTES
Vane Hernandez  Dear Dr. Jose Swenson,     Thank you for requesting ultrasound evaluation and maternal fetal medicine consultation on your patient Linda Sommers.   As you are aware she is a 38year old female  with a Sin  mortality and  delivery. We discussed the plan of care and the rationale for the increased surveillance. We reviewed the signs and symptoms of preeclampsia,  labor and monitoring fetal movement.   We discussed reasons for her to c

## 2021-01-08 ENCOUNTER — ROUTINE PRENATAL (OUTPATIENT)
Dept: OBGYN CLINIC | Facility: CLINIC | Age: 36
End: 2021-01-08
Payer: MEDICAID

## 2021-01-08 VITALS
DIASTOLIC BLOOD PRESSURE: 69 MMHG | SYSTOLIC BLOOD PRESSURE: 107 MMHG | WEIGHT: 235 LBS | HEART RATE: 80 BPM | BODY MASS INDEX: 38 KG/M2

## 2021-01-08 DIAGNOSIS — Z34.83 ENCOUNTER FOR SUPERVISION OF OTHER NORMAL PREGNANCY IN THIRD TRIMESTER: Primary | ICD-10-CM

## 2021-01-08 LAB
APPEARANCE: CLEAR
MULTISTIX LOT#: 5077 NUMERIC
URINE-COLOR: YELLOW

## 2021-01-08 PROCEDURE — 3078F DIAST BP <80 MM HG: CPT | Performed by: OBSTETRICS & GYNECOLOGY

## 2021-01-08 PROCEDURE — 3074F SYST BP LT 130 MM HG: CPT | Performed by: OBSTETRICS & GYNECOLOGY

## 2021-01-08 PROCEDURE — 0502F SUBSEQUENT PRENATAL CARE: CPT | Performed by: OBSTETRICS & GYNECOLOGY

## 2021-01-08 PROCEDURE — 81002 URINALYSIS NONAUTO W/O SCOPE: CPT | Performed by: OBSTETRICS & GYNECOLOGY

## 2021-01-18 ENCOUNTER — TELEPHONE (OUTPATIENT)
Dept: OBGYN CLINIC | Facility: CLINIC | Age: 36
End: 2021-01-18

## 2021-01-18 NOTE — TELEPHONE ENCOUNTER
Patient is at the dentist.  They are requiring a fax stating it is ok for her to have xrays done today. Please advise.     Fax: [phone]

## 2021-01-18 NOTE — TELEPHONE ENCOUNTER
Letter generated.     RN working at [facility] will fax letter to number below

## 2021-01-18 NOTE — TELEPHONE ENCOUNTER
Calling for updated status on letter to be able to get an xray at the dentist office. Patient is currently sitting at the dentist office waiting.     Please Advise

## 2021-01-19 NOTE — TELEPHONE ENCOUNTER
Per TE 12/22 (3) visits are good until 01/31/21 has used 2 visits, will call pt advised must come in before 02/03, states does not want the stress of having to call to schedule and them telling her she cant come in until has updated insurance so she rather

## 2021-01-19 NOTE — TELEPHONE ENCOUNTER
Please call pt to see if she switched insurance to [facility], if not visits need to be authorized. Pt. Also needs to be seen sooner than 2/3.  Last visit was 1/8/21 and she was told to be seen in 2 weeks

## 2021-01-22 ENCOUNTER — ROUTINE PRENATAL (OUTPATIENT)
Dept: OBGYN CLINIC | Facility: CLINIC | Age: 36
End: 2021-01-22
Payer: MEDICAID

## 2021-01-22 VITALS
SYSTOLIC BLOOD PRESSURE: 118 MMHG | HEART RATE: 88 BPM | BODY MASS INDEX: 39 KG/M2 | WEIGHT: 239 LBS | DIASTOLIC BLOOD PRESSURE: 73 MMHG

## 2021-01-22 DIAGNOSIS — Z34.83 ENCOUNTER FOR SUPERVISION OF OTHER NORMAL PREGNANCY IN THIRD TRIMESTER: Primary | ICD-10-CM

## 2021-01-22 LAB
APPEARANCE: CLEAR
APPEARANCE: CLEAR
MULTISTIX LOT#: 5077 NUMERIC
MULTISTIX LOT#: 5077 NUMERIC
URINE-COLOR: YELLOW
URINE-COLOR: YELLOW

## 2021-01-22 PROCEDURE — 81002 URINALYSIS NONAUTO W/O SCOPE: CPT | Performed by: OBSTETRICS & GYNECOLOGY

## 2021-01-22 PROCEDURE — 3074F SYST BP LT 130 MM HG: CPT | Performed by: OBSTETRICS & GYNECOLOGY

## 2021-01-22 PROCEDURE — 3078F DIAST BP <80 MM HG: CPT | Performed by: OBSTETRICS & GYNECOLOGY

## 2021-01-22 PROCEDURE — 0502F SUBSEQUENT PRENATAL CARE: CPT | Performed by: OBSTETRICS & GYNECOLOGY

## 2021-02-04 ENCOUNTER — ROUTINE PRENATAL (OUTPATIENT)
Dept: OBGYN CLINIC | Facility: CLINIC | Age: 36
End: 2021-02-04
Payer: MEDICAID

## 2021-02-04 ENCOUNTER — LAB ENCOUNTER (OUTPATIENT)
Dept: LAB | Facility: HOSPITAL | Age: 36
End: 2021-02-04
Attending: OBSTETRICS & GYNECOLOGY
Payer: MEDICAID

## 2021-02-04 VITALS
BODY MASS INDEX: 39 KG/M2 | WEIGHT: 239 LBS | DIASTOLIC BLOOD PRESSURE: 76 MMHG | SYSTOLIC BLOOD PRESSURE: 118 MMHG | HEART RATE: 89 BPM

## 2021-02-04 DIAGNOSIS — O09.523 MULTIGRAVIDA OF ADVANCED MATERNAL AGE IN THIRD TRIMESTER: ICD-10-CM

## 2021-02-04 DIAGNOSIS — Z34.83 ENCOUNTER FOR SUPERVISION OF OTHER NORMAL PREGNANCY IN THIRD TRIMESTER: ICD-10-CM

## 2021-02-04 DIAGNOSIS — Z34.93 ENCOUNTER FOR SUPERVISION OF NORMAL PREGNANCY IN THIRD TRIMESTER, UNSPECIFIED GRAVIDITY: Primary | ICD-10-CM

## 2021-02-04 LAB
APPEARANCE: CLEAR
DEPRECATED RDW RBC AUTO: 49.7 FL (ref 35.1–46.3)
ERYTHROCYTE [DISTWIDTH] IN BLOOD BY AUTOMATED COUNT: 15.7 % (ref 11–15)
HCT VFR BLD AUTO: 36.8 %
HGB BLD-MCNC: 11.6 G/DL
MCH RBC QN AUTO: 27.6 PG (ref 26–34)
MCHC RBC AUTO-ENTMCNC: 31.5 G/DL (ref 31–37)
MCV RBC AUTO: 87.4 FL
MULTISTIX LOT#: NORMAL NUMERIC
PH, URINE: 8 (ref 4.5–8)
PLATELET # BLD AUTO: 188 10(3)UL (ref 150–450)
RBC # BLD AUTO: 4.21 X10(6)UL
SPECIFIC GRAVITY: 1.01 (ref 1–1.03)
URINE-COLOR: YELLOW
WBC # BLD AUTO: 9.2 X10(3) UL (ref 4–11)

## 2021-02-04 PROCEDURE — 87389 HIV-1 AG W/HIV-1&-2 AB AG IA: CPT

## 2021-02-04 PROCEDURE — 0502F SUBSEQUENT PRENATAL CARE: CPT | Performed by: OBSTETRICS & GYNECOLOGY

## 2021-02-04 PROCEDURE — 85027 COMPLETE CBC AUTOMATED: CPT

## 2021-02-04 PROCEDURE — 3078F DIAST BP <80 MM HG: CPT | Performed by: OBSTETRICS & GYNECOLOGY

## 2021-02-04 PROCEDURE — 86780 TREPONEMA PALLIDUM: CPT

## 2021-02-04 PROCEDURE — 81002 URINALYSIS NONAUTO W/O SCOPE: CPT | Performed by: OBSTETRICS & GYNECOLOGY

## 2021-02-04 PROCEDURE — 36415 COLL VENOUS BLD VENIPUNCTURE: CPT

## 2021-02-04 PROCEDURE — 3074F SYST BP LT 130 MM HG: CPT | Performed by: OBSTETRICS & GYNECOLOGY

## 2021-02-05 LAB — T PALLIDUM AB SER QL: NEGATIVE

## 2021-02-11 ENCOUNTER — HOSPITAL ENCOUNTER (OUTPATIENT)
Dept: PERINATAL CARE | Facility: HOSPITAL | Age: 36
Discharge: HOME | End: 2021-02-11
Attending: OBSTETRICS & GYNECOLOGY
Payer: MEDICAID

## 2021-02-11 ENCOUNTER — ROUTINE PRENATAL (OUTPATIENT)
Dept: OBGYN CLINIC | Facility: CLINIC | Age: 36
End: 2021-02-11
Payer: MEDICAID

## 2021-02-11 VITALS
WEIGHT: 243.19 LBS | BODY MASS INDEX: 39 KG/M2 | DIASTOLIC BLOOD PRESSURE: 75 MMHG | SYSTOLIC BLOOD PRESSURE: 128 MMHG | HEART RATE: 99 BPM

## 2021-02-11 DIAGNOSIS — O09.521 MULTIGRAVIDA OF ADVANCED MATERNAL AGE IN FIRST TRIMESTER: Primary | ICD-10-CM

## 2021-02-11 DIAGNOSIS — Z34.91 ENCOUNTER FOR SUPERVISION OF NORMAL PREGNANCY IN FIRST TRIMESTER, UNSPECIFIED GRAVIDITY: Primary | ICD-10-CM

## 2021-02-11 LAB
APPEARANCE: CLEAR
MULTISTIX LOT#: 5077 NUMERIC
PH, URINE: 8 (ref 4.5–8)
SPECIFIC GRAVITY: 1.01 (ref 1–1.03)
URINE-COLOR: YELLOW

## 2021-02-11 PROCEDURE — 3078F DIAST BP <80 MM HG: CPT | Performed by: OBSTETRICS & GYNECOLOGY

## 2021-02-11 PROCEDURE — 81002 URINALYSIS NONAUTO W/O SCOPE: CPT | Performed by: OBSTETRICS & GYNECOLOGY

## 2021-02-11 PROCEDURE — 3074F SYST BP LT 130 MM HG: CPT | Performed by: OBSTETRICS & GYNECOLOGY

## 2021-02-11 PROCEDURE — 0502F SUBSEQUENT PRENATAL CARE: CPT | Performed by: OBSTETRICS & GYNECOLOGY

## 2021-02-11 PROCEDURE — 59025 FETAL NON-STRESS TEST: CPT | Performed by: OBSTETRICS & GYNECOLOGY

## 2021-02-11 NOTE — NST
Nonstress Test   Patient: Rhonda Leos    Gestation: 36w1d    NST: ama       Variability: Moderate           Accelerations: Yes           Decelerations: None            Baseline: 140 BPM           Uterine Irritability: No           Contractions: Irregul

## 2021-02-18 ENCOUNTER — HOSPITAL ENCOUNTER (OUTPATIENT)
Dept: PERINATAL CARE | Facility: HOSPITAL | Age: 36
Discharge: HOME | End: 2021-02-18
Attending: OBSTETRICS & GYNECOLOGY
Payer: MEDICAID

## 2021-02-18 ENCOUNTER — ROUTINE PRENATAL (OUTPATIENT)
Dept: OBGYN CLINIC | Facility: CLINIC | Age: 36
End: 2021-02-18
Payer: MEDICAID

## 2021-02-18 VITALS
SYSTOLIC BLOOD PRESSURE: 122 MMHG | BODY MASS INDEX: 40 KG/M2 | HEART RATE: 86 BPM | WEIGHT: 245 LBS | DIASTOLIC BLOOD PRESSURE: 76 MMHG

## 2021-02-18 DIAGNOSIS — O09.521 MULTIGRAVIDA OF ADVANCED MATERNAL AGE IN FIRST TRIMESTER: Primary | ICD-10-CM

## 2021-02-18 DIAGNOSIS — Z34.93 ENCOUNTER FOR SUPERVISION OF NORMAL PREGNANCY IN THIRD TRIMESTER, UNSPECIFIED GRAVIDITY: Primary | ICD-10-CM

## 2021-02-18 PROBLEM — B95.1 POSITIVE GBS TEST: Status: ACTIVE | Noted: 2021-02-18

## 2021-02-18 LAB
APPEARANCE: CLEAR
MULTISTIX LOT#: NORMAL NUMERIC
PH, URINE: 7 (ref 4.5–8)
SPECIFIC GRAVITY: 1.01 (ref 1–1.03)
URINE-COLOR: YELLOW

## 2021-02-18 PROCEDURE — 59025 FETAL NON-STRESS TEST: CPT | Performed by: OBSTETRICS & GYNECOLOGY

## 2021-02-18 PROCEDURE — 81002 URINALYSIS NONAUTO W/O SCOPE: CPT | Performed by: OBSTETRICS & GYNECOLOGY

## 2021-02-18 PROCEDURE — 0502F SUBSEQUENT PRENATAL CARE: CPT | Performed by: OBSTETRICS & GYNECOLOGY

## 2021-02-18 PROCEDURE — 3078F DIAST BP <80 MM HG: CPT | Performed by: OBSTETRICS & GYNECOLOGY

## 2021-02-18 PROCEDURE — 3074F SYST BP LT 130 MM HG: CPT | Performed by: OBSTETRICS & GYNECOLOGY

## 2021-02-18 NOTE — NST
Nonstress Test   Patient: Willis Morin    Gestation: 37w1d    NST: ama       Variability: Moderate           Accelerations: Yes           Decelerations: None            Baseline: 120 BPM           Uterine Irritability: Yes           Contractions: Irregu

## 2021-02-21 NOTE — ADDENDUM NOTE
Encounter addended by: Susan Ernst MD on: 2/21/2021 5:14 PM   Actions taken: Clinical Note Signed, Charge Capture section accepted

## 2021-02-23 NOTE — ADDENDUM NOTE
Encounter addended by: Christian Virgen DO on: 2/23/2021 9:10 AM   Actions taken: Clinical Note Signed, Charge Capture section accepted

## 2021-02-25 ENCOUNTER — ROUTINE PRENATAL (OUTPATIENT)
Dept: OBGYN CLINIC | Facility: CLINIC | Age: 36
End: 2021-02-25
Payer: MEDICAID

## 2021-02-25 ENCOUNTER — LAB ENCOUNTER (OUTPATIENT)
Dept: LAB | Facility: HOSPITAL | Age: 36
End: 2021-02-25
Attending: OBSTETRICS & GYNECOLOGY
Payer: MEDICAID

## 2021-02-25 ENCOUNTER — HOSPITAL ENCOUNTER (OUTPATIENT)
Dept: PERINATAL CARE | Facility: HOSPITAL | Age: 36
Discharge: HOME | End: 2021-02-25
Attending: OBSTETRICS & GYNECOLOGY
Payer: MEDICAID

## 2021-02-25 VITALS
HEART RATE: 92 BPM | DIASTOLIC BLOOD PRESSURE: 75 MMHG | BODY MASS INDEX: 39 KG/M2 | SYSTOLIC BLOOD PRESSURE: 114 MMHG | WEIGHT: 243.81 LBS

## 2021-02-25 DIAGNOSIS — Z34.83 ENCOUNTER FOR SUPERVISION OF OTHER NORMAL PREGNANCY IN THIRD TRIMESTER: ICD-10-CM

## 2021-02-25 DIAGNOSIS — O09.521 MULTIGRAVIDA OF ADVANCED MATERNAL AGE IN FIRST TRIMESTER: Primary | ICD-10-CM

## 2021-02-25 DIAGNOSIS — Z34.83 ENCOUNTER FOR SUPERVISION OF OTHER NORMAL PREGNANCY IN THIRD TRIMESTER: Primary | ICD-10-CM

## 2021-02-25 LAB
APPEARANCE: CLEAR
BILIRUB UR QL: NEGATIVE
CLARITY UR: CLEAR
COLOR UR: YELLOW
GLUCOSE UR-MCNC: NEGATIVE MG/DL
KETONES UR-MCNC: NEGATIVE MG/DL
LEUKOCYTE ESTERASE UR QL STRIP.AUTO: NEGATIVE
MULTISTIX LOT#: 5077 NUMERIC
NITRITE UR QL STRIP.AUTO: NEGATIVE
PH UR: 7 [PH] (ref 5–8)
PH, URINE: 6.5 (ref 4.5–8)
PROT UR-MCNC: NEGATIVE MG/DL
PROTEIN (URINE DIPSTICK): 30 MG/DL
SP GR UR STRIP: 1.01 (ref 1–1.03)
SPECIFIC GRAVITY: 1.02 (ref 1–1.03)
URINE-COLOR: YELLOW
UROBILINOGEN UR STRIP-ACNC: <2
UROBILINOGEN,SEMI-QN: 0.2 MG/DL (ref 0–1.9)

## 2021-02-25 PROCEDURE — 3074F SYST BP LT 130 MM HG: CPT | Performed by: OBSTETRICS & GYNECOLOGY

## 2021-02-25 PROCEDURE — 0502F SUBSEQUENT PRENATAL CARE: CPT | Performed by: OBSTETRICS & GYNECOLOGY

## 2021-02-25 PROCEDURE — 3078F DIAST BP <80 MM HG: CPT | Performed by: OBSTETRICS & GYNECOLOGY

## 2021-02-25 PROCEDURE — 81002 URINALYSIS NONAUTO W/O SCOPE: CPT | Performed by: OBSTETRICS & GYNECOLOGY

## 2021-02-25 PROCEDURE — 87086 URINE CULTURE/COLONY COUNT: CPT

## 2021-02-25 PROCEDURE — 81001 URINALYSIS AUTO W/SCOPE: CPT

## 2021-02-25 PROCEDURE — 59025 FETAL NON-STRESS TEST: CPT

## 2021-02-26 ENCOUNTER — NST DOCUMENTATION (OUTPATIENT)
Dept: OBGYN CLINIC | Facility: CLINIC | Age: 36
End: 2021-02-26

## 2021-02-26 PROCEDURE — 59025 FETAL NON-STRESS TEST: CPT | Performed by: OBSTETRICS & GYNECOLOGY

## 2021-02-26 NOTE — NST
Nonstress Test   Patient: Lg Delgado    Gestation: 38w2d    Diagnosis from order: Multigravida of advanced maternal age in third trimester       NST:         NST DOCUMENTATION 2/25/2021   Variability 6-25 BPM   Accelerations Yes   Decelerations Variab

## 2021-03-04 ENCOUNTER — ROUTINE PRENATAL (OUTPATIENT)
Dept: OBGYN CLINIC | Facility: CLINIC | Age: 36
End: 2021-03-04
Payer: MEDICAID

## 2021-03-04 ENCOUNTER — HOSPITAL ENCOUNTER (OUTPATIENT)
Dept: PERINATAL CARE | Facility: HOSPITAL | Age: 36
Discharge: HOME | End: 2021-03-04
Attending: OBSTETRICS & GYNECOLOGY
Payer: MEDICAID

## 2021-03-04 VITALS
WEIGHT: 246 LBS | SYSTOLIC BLOOD PRESSURE: 123 MMHG | HEART RATE: 81 BPM | BODY MASS INDEX: 40 KG/M2 | DIASTOLIC BLOOD PRESSURE: 80 MMHG

## 2021-03-04 VITALS — SYSTOLIC BLOOD PRESSURE: 106 MMHG | HEART RATE: 114 BPM | DIASTOLIC BLOOD PRESSURE: 67 MMHG

## 2021-03-04 DIAGNOSIS — Z34.83 ENCOUNTER FOR SUPERVISION OF OTHER NORMAL PREGNANCY IN THIRD TRIMESTER: Primary | ICD-10-CM

## 2021-03-04 DIAGNOSIS — O09.521 MULTIGRAVIDA OF ADVANCED MATERNAL AGE IN FIRST TRIMESTER: Primary | ICD-10-CM

## 2021-03-04 LAB
MULTISTIX EXPIRATION DATE: 4121 DATE
MULTISTIX LOT#: 5077 NUMERIC
PH, URINE: 6.5 (ref 4.5–8)
SPECIFIC GRAVITY: 1.01 (ref 1–1.03)
UROBILINOGEN,SEMI-QN: 0.2 MG/DL (ref 0–1.9)

## 2021-03-04 PROCEDURE — 3079F DIAST BP 80-89 MM HG: CPT | Performed by: OBSTETRICS & GYNECOLOGY

## 2021-03-04 PROCEDURE — 0502F SUBSEQUENT PRENATAL CARE: CPT | Performed by: OBSTETRICS & GYNECOLOGY

## 2021-03-04 PROCEDURE — 3074F SYST BP LT 130 MM HG: CPT | Performed by: OBSTETRICS & GYNECOLOGY

## 2021-03-04 PROCEDURE — 59025 FETAL NON-STRESS TEST: CPT

## 2021-03-04 PROCEDURE — 81002 URINALYSIS NONAUTO W/O SCOPE: CPT | Performed by: OBSTETRICS & GYNECOLOGY

## 2021-03-08 ENCOUNTER — TELEPHONE (OUTPATIENT)
Dept: OBGYN CLINIC | Facility: CLINIC | Age: 36
End: 2021-03-08

## 2021-03-08 ENCOUNTER — NST DOCUMENTATION (OUTPATIENT)
Dept: OBGYN CLINIC | Facility: CLINIC | Age: 36
End: 2021-03-08

## 2021-03-08 ENCOUNTER — HOSPITAL ENCOUNTER (INPATIENT)
Facility: HOSPITAL | Age: 36
LOS: 2 days | Discharge: HOME | End: 2021-03-10
Attending: OBSTETRICS & GYNECOLOGY | Admitting: OBSTETRICS & GYNECOLOGY
Payer: MEDICAID

## 2021-03-08 ENCOUNTER — ANESTHESIA (OUTPATIENT)
Dept: OBGYN UNIT | Facility: HOSPITAL | Age: 36
End: 2021-03-08
Payer: MEDICAID

## 2021-03-08 ENCOUNTER — ANESTHESIA EVENT (OUTPATIENT)
Dept: OBGYN UNIT | Facility: HOSPITAL | Age: 36
End: 2021-03-08
Payer: MEDICAID

## 2021-03-08 PROBLEM — Z34.90 PREGNANCY (HCC): Status: ACTIVE | Noted: 2021-03-08

## 2021-03-08 PROBLEM — Z34.90 PREGNANCY: Status: ACTIVE | Noted: 2021-03-08

## 2021-03-08 LAB
ANTIBODY SCREEN: NEGATIVE
BASOPHILS # BLD AUTO: 0.03 X10(3) UL (ref 0–0.2)
BASOPHILS NFR BLD AUTO: 0.3 %
DEPRECATED RDW RBC AUTO: 45.2 FL (ref 35.1–46.3)
EOSINOPHIL # BLD AUTO: 0.07 X10(3) UL (ref 0–0.7)
EOSINOPHIL NFR BLD AUTO: 0.7 %
ERYTHROCYTE [DISTWIDTH] IN BLOOD BY AUTOMATED COUNT: 14.2 % (ref 11–15)
HCT VFR BLD AUTO: 39 %
HGB BLD-MCNC: 12.3 G/DL
IMM GRANULOCYTES # BLD AUTO: 0.05 X10(3) UL (ref 0–1)
IMM GRANULOCYTES NFR BLD: 0.5 %
LYMPHOCYTES # BLD AUTO: 1.3 X10(3) UL (ref 1–4)
LYMPHOCYTES NFR BLD AUTO: 12.6 %
MCH RBC QN AUTO: 27.5 PG (ref 26–34)
MCHC RBC AUTO-ENTMCNC: 31.5 G/DL (ref 31–37)
MCV RBC AUTO: 87.2 FL
MONOCYTES # BLD AUTO: 1.09 X10(3) UL (ref 0.1–1)
MONOCYTES NFR BLD AUTO: 10.6 %
NEUTROPHILS # BLD AUTO: 7.75 X10 (3) UL (ref 1.5–7.7)
NEUTROPHILS # BLD AUTO: 7.75 X10(3) UL (ref 1.5–7.7)
NEUTROPHILS NFR BLD AUTO: 75.3 %
PLATELET # BLD AUTO: 196 10(3)UL (ref 150–450)
RBC # BLD AUTO: 4.47 X10(6)UL
RH BLOOD TYPE: POSITIVE
SARS-COV-2 RNA RESP QL NAA+PROBE: NOT DETECTED
WBC # BLD AUTO: 10.3 X10(3) UL (ref 4–11)

## 2021-03-08 PROCEDURE — 59409 OBSTETRICAL CARE: CPT | Performed by: OBSTETRICS & GYNECOLOGY

## 2021-03-08 PROCEDURE — 0UQMXZZ REPAIR VULVA, EXTERNAL APPROACH: ICD-10-PCS | Performed by: OBSTETRICS & GYNECOLOGY

## 2021-03-08 PROCEDURE — 59025 FETAL NON-STRESS TEST: CPT | Performed by: OBSTETRICS & GYNECOLOGY

## 2021-03-08 RX ORDER — ONDANSETRON 2 MG/ML
4 INJECTION INTRAMUSCULAR; INTRAVENOUS EVERY 6 HOURS PRN
Status: DISCONTINUED | OUTPATIENT
Start: 2021-03-08 | End: 2021-03-10

## 2021-03-08 RX ORDER — ACETAMINOPHEN 500 MG
500 TABLET ORAL EVERY 6 HOURS PRN
Status: DISCONTINUED | OUTPATIENT
Start: 2021-03-08 | End: 2021-03-08 | Stop reason: HOSPADM

## 2021-03-08 RX ORDER — IBUPROFEN 600 MG/1
600 TABLET ORAL EVERY 6 HOURS PRN
Status: DISCONTINUED | OUTPATIENT
Start: 2021-03-08 | End: 2021-03-08 | Stop reason: HOSPADM

## 2021-03-08 RX ORDER — DEXTROSE, SODIUM CHLORIDE, SODIUM LACTATE, POTASSIUM CHLORIDE, AND CALCIUM CHLORIDE 5; .6; .31; .03; .02 G/100ML; G/100ML; G/100ML; G/100ML; G/100ML
INJECTION, SOLUTION INTRAVENOUS CONTINUOUS
Status: DISCONTINUED | OUTPATIENT
Start: 2021-03-08 | End: 2021-03-08 | Stop reason: HOSPADM

## 2021-03-08 RX ORDER — ONDANSETRON 2 MG/ML
4 INJECTION INTRAMUSCULAR; INTRAVENOUS EVERY 6 HOURS PRN
Status: DISCONTINUED | OUTPATIENT
Start: 2021-03-08 | End: 2021-03-08 | Stop reason: HOSPADM

## 2021-03-08 RX ORDER — HYDROCODONE BITARTRATE AND ACETAMINOPHEN 5; 325 MG/1; MG/1
1 TABLET ORAL EVERY 6 HOURS PRN
Status: DISCONTINUED | OUTPATIENT
Start: 2021-03-08 | End: 2021-03-10

## 2021-03-08 RX ORDER — LIDOCAINE HYDROCHLORIDE 10 MG/ML
30 INJECTION, SOLUTION EPIDURAL; INFILTRATION; INTRACAUDAL; PERINEURAL ONCE
Status: DISCONTINUED | OUTPATIENT
Start: 2021-03-08 | End: 2021-03-08 | Stop reason: HOSPADM

## 2021-03-08 RX ORDER — TERBUTALINE SULFATE 1 MG/ML
0.25 INJECTION, SOLUTION SUBCUTANEOUS AS NEEDED
Status: DISCONTINUED | OUTPATIENT
Start: 2021-03-08 | End: 2021-03-08 | Stop reason: HOSPADM

## 2021-03-08 RX ORDER — NALBUPHINE HCL 10 MG/ML
2.5 AMPUL (ML) INJECTION
Status: DISCONTINUED | OUTPATIENT
Start: 2021-03-08 | End: 2021-03-10

## 2021-03-08 RX ORDER — LIDOCAINE HYDROCHLORIDE AND EPINEPHRINE 15; 5 MG/ML; UG/ML
INJECTION, SOLUTION EPIDURAL AS NEEDED
Status: DISCONTINUED | OUTPATIENT
Start: 2021-03-08 | End: 2021-03-08 | Stop reason: SURG

## 2021-03-08 RX ORDER — SODIUM CHLORIDE, SODIUM LACTATE, POTASSIUM CHLORIDE, CALCIUM CHLORIDE 600; 310; 30; 20 MG/100ML; MG/100ML; MG/100ML; MG/100ML
INJECTION, SOLUTION INTRAVENOUS AS NEEDED
Status: DISCONTINUED | OUTPATIENT
Start: 2021-03-08 | End: 2021-03-08 | Stop reason: HOSPADM

## 2021-03-08 RX ORDER — DOCUSATE SODIUM 100 MG/1
100 CAPSULE, LIQUID FILLED ORAL
Status: DISCONTINUED | OUTPATIENT
Start: 2021-03-08 | End: 2021-03-10

## 2021-03-08 RX ORDER — TRISODIUM CITRATE DIHYDRATE AND CITRIC ACID MONOHYDRATE 500; 334 MG/5ML; MG/5ML
30 SOLUTION ORAL AS NEEDED
Status: DISCONTINUED | OUTPATIENT
Start: 2021-03-08 | End: 2021-03-08 | Stop reason: HOSPADM

## 2021-03-08 RX ORDER — IBUPROFEN 600 MG/1
600 TABLET ORAL EVERY 6 HOURS
Status: DISCONTINUED | OUTPATIENT
Start: 2021-03-08 | End: 2021-03-10

## 2021-03-08 RX ORDER — CHOLECALCIFEROL (VITAMIN D3) 25 MCG
1 TABLET,CHEWABLE ORAL DAILY
Status: DISCONTINUED | OUTPATIENT
Start: 2021-03-08 | End: 2021-03-10

## 2021-03-08 RX ORDER — AMMONIA INHALANTS 0.04 G/.3ML
0.3 INHALANT RESPIRATORY (INHALATION) AS NEEDED
Status: DISCONTINUED | OUTPATIENT
Start: 2021-03-08 | End: 2021-03-08 | Stop reason: HOSPADM

## 2021-03-08 RX ORDER — BUPIVACAINE HYDROCHLORIDE 2.5 MG/ML
20 INJECTION, SOLUTION EPIDURAL; INFILTRATION; INTRACAUDAL ONCE
Status: COMPLETED | OUTPATIENT
Start: 2021-03-08 | End: 2021-03-08

## 2021-03-08 RX ORDER — BUPIVACAINE HCL/0.9 % NACL/PF 0.25 %
5 PLASTIC BAG, INJECTION (ML) EPIDURAL AS NEEDED
Status: DISCONTINUED | OUTPATIENT
Start: 2021-03-08 | End: 2021-03-10

## 2021-03-08 RX ORDER — LIDOCAINE HYDROCHLORIDE 10 MG/ML
INJECTION, SOLUTION EPIDURAL; INFILTRATION; INTRACAUDAL; PERINEURAL AS NEEDED
Status: DISCONTINUED | OUTPATIENT
Start: 2021-03-08 | End: 2021-03-08 | Stop reason: SURG

## 2021-03-08 RX ORDER — ACETAMINOPHEN 325 MG/1
650 TABLET ORAL EVERY 6 HOURS PRN
Status: DISCONTINUED | OUTPATIENT
Start: 2021-03-08 | End: 2021-03-10

## 2021-03-08 RX ORDER — DIAPER,BRIEF,INFANT-TODD,DISP
1 EACH MISCELLANEOUS EVERY 6 HOURS PRN
Status: DISCONTINUED | OUTPATIENT
Start: 2021-03-08 | End: 2021-03-10

## 2021-03-08 RX ORDER — AMMONIA INHALANTS 0.04 G/.3ML
0.3 INHALANT RESPIRATORY (INHALATION) AS NEEDED
Status: DISCONTINUED | OUTPATIENT
Start: 2021-03-08 | End: 2021-03-10

## 2021-03-08 RX ORDER — SIMETHICONE 80 MG
80 TABLET,CHEWABLE ORAL 3 TIMES DAILY PRN
Status: DISCONTINUED | OUTPATIENT
Start: 2021-03-08 | End: 2021-03-10

## 2021-03-08 RX ORDER — BISACODYL 10 MG
10 SUPPOSITORY, RECTAL RECTAL ONCE AS NEEDED
Status: DISCONTINUED | OUTPATIENT
Start: 2021-03-08 | End: 2021-03-10

## 2021-03-08 RX ADMIN — LIDOCAINE HYDROCHLORIDE AND EPINEPHRINE 3 ML: 15; 5 INJECTION, SOLUTION EPIDURAL at 13:20:00

## 2021-03-08 RX ADMIN — LIDOCAINE HYDROCHLORIDE 5 ML: 10 INJECTION, SOLUTION EPIDURAL; INFILTRATION; INTRACAUDAL; PERINEURAL at 13:07:00

## 2021-03-08 RX ADMIN — BUPIVACAINE HYDROCHLORIDE 4 ML: 2.5 INJECTION, SOLUTION EPIDURAL; INFILTRATION; INTRACAUDAL at 13:17:00

## 2021-03-08 RX ADMIN — BUPIVACAINE HYDROCHLORIDE 3 ML: 2.5 INJECTION, SOLUTION EPIDURAL; INFILTRATION; INTRACAUDAL at 13:15:00

## 2021-03-08 NOTE — DISCHARGE SUMMARY
[facility]    Discharge Summary    Irl Daughters Patient Status:  Inpatient    7/10/1985 MRN [id number]   Location [address] [facility] Attending Franklin French MD   [facility] Day # 2       Delivering OB Clinician:

## 2021-03-08 NOTE — TELEPHONE ENCOUNTER
Paged [facility]. Sent pt to [facility]. Informed [facility] that pt is coming to see them.

## 2021-03-08 NOTE — L&D DELIVERY NOTE
[facility] - [facility]    Vaginal Delivery Note    Sharon Branch Patient Status:  Inpatient    7/10/1985 MRN [id number]   Location [address]  Attending Yadira Leos MD   Hosp Day # 0 PCP Aashish Peña MD     [facility] was having difficulty at this point delivering the fetus so verbal consent was given for vacuum assistance. Scalp clip removed. [place] had already been removed once patient started pushing so bladder was already empty.   Vacuum applied and pumped to green

## 2021-03-08 NOTE — ANESTHESIA PROCEDURE NOTES
Labor Analgesia  Performed by: Una Jacobsen MD  Authorized by: Una Jacobsen MD       General Information and Staff    Start Time:  3/8/2021 1:07 PM  End Time:  3/8/2021 1:23 PM  Anesthesiologist:  Una Jacobsen MD  Performed by:   Anesthesiologis

## 2021-03-08 NOTE — NST
Nonstress Test   Patient: Darylene Flash    Gestation: 39w5d    Diagnosis from order: Multigravida of advanced maternal age in third trimester       NST:         NST DOCUMENTATION 3/4/2021   Variability 6-25 BPM   Accelerations Yes   Decelerations None

## 2021-03-08 NOTE — ANESTHESIA POSTPROCEDURE EVALUATION
Patient: Boaz August    Procedure Summary     Date: 03/08/21 Room / Location:     Anesthesia Start: 1259 Anesthesia Stop: 9874    Procedure: LABOR ANALGESIA Diagnosis:     Scheduled Providers:  Anesthesiologist:     Anesthesia Type: epidural ASA Status

## 2021-03-08 NOTE — PROGRESS NOTES
Pt is a 38year old female admitted to TR1/TR1-A. Patient presents with:  R/o Labor: contractions since yesterday, q 10 min and strong since 0930   Decreased Fetal Movement: last felt movement at 0800      Pt is  39w5d intra-uterine pregnancy.   Hi

## 2021-03-08 NOTE — ANESTHESIA PREPROCEDURE EVALUATION
Anesthesia PreOp Note    HPI:     Meg Harding is a 38year old female who presents for preoperative consultation requested by: * No surgeons listed *    Date of Surgery: 3/8/2021    * No procedures listed *  Indication: * No pre-op diagnosis entered * mL, Oral, PRN, Page, Katharyn Leyden, MD  ondansetron HCl (ZOFRAN) injection 4 mg, 4 mg, Intravenous, Q6H PRN, Page, Katharyn Leyden, MD  Ammonia Aromatic (ammonia) nasal solution 0.3 mL, 0.3 mL, Nasal, PRN, Page, Katharyn Leyden, MD  lidocaine PF (XYLOCAINE) 1% injection, education: Not on file      Highest education level: Not on file    Occupational History      Not on file    Tobacco Use      Smoking status: Never Smoker      Smokeless tobacco: Never Used    Vaping Use      Vaping Use: Never used    Substance and Sexual RBC 4.47 03/08/2021    HGB 12.3 03/08/2021    HCT 39.0 03/08/2021    MCV 87.2 03/08/2021    MCH 27.5 03/08/2021    MCHC 31.5 03/08/2021    RDW 14.2 03/08/2021    .0 03/08/2021             Vital Signs:   There is no height or weight on file to calcula

## 2021-03-08 NOTE — H&P
[id number] Patient Status:  Inpatient    7/10/1985 MRN [id number]   Location [address] [facility] Attending Page, Miriam Hercules MD   Hosp Day # 0 PCP Ramos Mathews MD     Date of A nasal mass, benign     Family History:   Family History   Problem Relation Age of Onset   • Diabetes Other    • Other (Other[other]) Mother         allergies, asthma, eczema   • Diabetes Mother    • Hypertension Brother    • Other (Other[other]) Brother MCH 27.5 26.0 - 34.0 pg    MCHC 31.5 31.0 - 37.0 g/dL    RDW-SD 45.2 35.1 - 46.3 fL    RDW 14.2 11.0 - 15.0 %    .0 150.0 - 450.0 10(3)uL    Neutrophil Absolute Prelim 7.75 (H) 1.50 - 7.70 x10 (3) uL    Neutrophil Absolute 7.75 (H) 1.50 - 7.70 x1

## 2021-03-09 LAB
BASOPHILS # BLD AUTO: 0.04 X10(3) UL (ref 0–0.2)
BASOPHILS NFR BLD AUTO: 0.3 %
DEPRECATED RDW RBC AUTO: 44.6 FL (ref 35.1–46.3)
EOSINOPHIL # BLD AUTO: 0.14 X10(3) UL (ref 0–0.7)
EOSINOPHIL NFR BLD AUTO: 1.1 %
ERYTHROCYTE [DISTWIDTH] IN BLOOD BY AUTOMATED COUNT: 14.2 % (ref 11–15)
HCT VFR BLD AUTO: 37.4 %
HGB BLD-MCNC: 11.9 G/DL
IMM GRANULOCYTES # BLD AUTO: 0.07 X10(3) UL (ref 0–1)
IMM GRANULOCYTES NFR BLD: 0.6 %
LYMPHOCYTES # BLD AUTO: 1.45 X10(3) UL (ref 1–4)
LYMPHOCYTES NFR BLD AUTO: 11.8 %
MCH RBC QN AUTO: 27.4 PG (ref 26–34)
MCHC RBC AUTO-ENTMCNC: 31.8 G/DL (ref 31–37)
MCV RBC AUTO: 86.2 FL
MONOCYTES # BLD AUTO: 1.4 X10(3) UL (ref 0.1–1)
MONOCYTES NFR BLD AUTO: 11.3 %
NEUTROPHILS # BLD AUTO: 9.24 X10 (3) UL (ref 1.5–7.7)
NEUTROPHILS # BLD AUTO: 9.24 X10(3) UL (ref 1.5–7.7)
NEUTROPHILS NFR BLD AUTO: 74.9 %
PLATELET # BLD AUTO: 184 10(3)UL (ref 150–450)
RBC # BLD AUTO: 4.34 X10(6)UL
WBC # BLD AUTO: 12.3 X10(3) UL (ref 4–11)

## 2021-03-09 NOTE — PROGRESS NOTES
Patient up to bathroom with assist x 2. Voided 500cc at this time. Patient transferred to mother/baby room 364 per wheelchair in stable condition with baby and personal belongings. Accompanied by significant other and staff.   Report given to mother/baby

## 2021-03-09 NOTE — PROGRESS NOTES
[facility]    OB/Gyne Post  Progress Note      Sage Alberts Patient Status:  Inpatient    7/10/1985 MRN [id number]   Location [facility] 3SE Attending Fam Valladares MD   Hosp Day # 1 PCP MD Josue Baires Collection Time: 03/08/21 12:20 PM    Specimen: Nares;  Other   Result Value Ref Range    Rapid SARS-CoV-2 by PCR Not Detected Not Detected   ABORH (BLOOD TYPE)    Collection Time: 03/08/21 12:20 PM   Result Value Ref Range    ABO BLOOD TYPE B     RH BLO patient expressed understanding, denied questions, and wishes to proceed with the procedure for her son.       Alex Colorado DO  3/9/2021  10:55 AM

## 2021-03-09 NOTE — CM/SW NOTE
FREDI self referral due to insurance    SW met with patient bedside. SW confirmed face sheet contact as correct.     Baby boy/girl name: Baby boy Rai Gypsy  Date & time of delivery: 3/8/21 @ 3:45pm  Delivery method:  Vaginal, Vacuum (Extractor)  Siblings age: Pt r

## 2021-03-09 NOTE — PROGRESS NOTES
Received patient into room 364 via WC . Bedside shift report received from ELVIN Wood. Pt transferred to bed. Bed in lowest and locked position. Side rails up x2. VSS. IV site unremarkable. Baby present in open crib. ID bands matched with L&D RN.   Patient

## 2021-03-10 VITALS
RESPIRATION RATE: 18 BRPM | TEMPERATURE: 99 F | OXYGEN SATURATION: 100 % | SYSTOLIC BLOOD PRESSURE: 138 MMHG | HEART RATE: 75 BPM | DIASTOLIC BLOOD PRESSURE: 70 MMHG

## 2021-03-10 NOTE — PLAN OF CARE
Problem: BIRTH - VAGINAL/ SECTION  Goal: Fetal and maternal status remain reassuring during the birth process  Description: INTERVENTIONS:  - Monitor vital signs  - Monitor fetal heart rate  - Monitor uterine activity  - Monitor labor progression choices  Outcome: Completed     Problem: Patient/Family Goals  Goal: Patient/Family Long Term Goal  Description: Patient's Long Term Goal:     Interventions:  -   - See additional Care Plan goals for specific interventions  Outcome: Completed  Goal: Donal received from MD.  Postpartum folder given, discharge medication form reviewed, signed and given to patient. ID Band matched with baby band. Patient informed when to make a follow-up appointment with OB. Mother is interacting appropriately with baby.  Kyle Brothers

## 2021-03-22 ENCOUNTER — TELEPHONE (OUTPATIENT)
Dept: OBGYN UNIT | Facility: HOSPITAL | Age: 36
End: 2021-03-22

## 2021-04-19 ENCOUNTER — TELEPHONE (OUTPATIENT)
Dept: OBGYN CLINIC | Facility: CLINIC | Age: 36
End: 2021-04-19

## 2021-04-19 NOTE — TELEPHONE ENCOUNTER
Patient was late for her PP appointment today and wasn't seen. Was rescheduled for 5/17. Patient wants birth control sooner and wanted to know if BENJAMÍN or another provider could prescribe sooner than appointment in May. Thank you!

## 2021-04-19 NOTE — TELEPHONE ENCOUNTER
Pt accepted appt with BENJAMÍN on 4/26 at noon. Pt appreciative. Message to Martina, supervisor, as Michelle Mcelroy.

## 2021-04-19 NOTE — TELEPHONE ENCOUNTER
Pt informed of below and verbalized understanding. Next OB slot at [facility] is not for several weeks. Message to BENJAMÍN-is there any way you can see her for PP in the next two weeks at [facility]? Next OB slot is 2 weeks out at [facility]. Pt has 3 kids and moved 35 min away.  Nolvia Boston

## 2021-04-19 NOTE — TELEPHONE ENCOUNTER
Pt asking if she can be prescribed Desogen OCP that she was on prior to pregnancy before she is seen for PP appt on 5/17. Pt stated she is bottle feeding.  Pt stated that she has 2 other kids in school and it is very hard for her to make appts during the da

## 2021-04-19 NOTE — TELEPHONE ENCOUNTER
4/26 at noon.   Please ask her to be here early so the MAs can get her roomed on time since we are adding her on

## 2021-04-26 ENCOUNTER — POSTPARTUM (OUTPATIENT)
Dept: OBGYN CLINIC | Facility: CLINIC | Age: 36
End: 2021-04-26
Payer: MEDICAID

## 2021-04-26 VITALS
DIASTOLIC BLOOD PRESSURE: 76 MMHG | WEIGHT: 226 LBS | SYSTOLIC BLOOD PRESSURE: 121 MMHG | BODY MASS INDEX: 36 KG/M2 | HEART RATE: 74 BPM

## 2021-04-26 PROCEDURE — 3078F DIAST BP <80 MM HG: CPT | Performed by: OBSTETRICS & GYNECOLOGY

## 2021-04-26 PROCEDURE — 0503F POSTPARTUM CARE VISIT: CPT | Performed by: OBSTETRICS & GYNECOLOGY

## 2021-04-26 PROCEDURE — 96160 PT-FOCUSED HLTH RISK ASSMT: CPT | Performed by: OBSTETRICS & GYNECOLOGY

## 2021-04-26 PROCEDURE — 3074F SYST BP LT 130 MM HG: CPT | Performed by: OBSTETRICS & GYNECOLOGY

## 2021-04-26 RX ORDER — DESOGESTREL AND ETHINYL ESTRADIOL 0.15-0.03
1 KIT ORAL DAILY
Qty: 3 PACKAGE | Refills: 1 | Status: SHIPPED | OUTPATIENT
Start: 2021-04-26 | End: 2021-10-04

## 2021-05-09 NOTE — H&P
RADHA Sommers is a 38year old female  here for 6 week post-partum visit. Patient delivered a  male infant on 3/8/21 via . Patient desires COCPs for contraception. Patient is formula feeding.    Patient denies symptoms of depression, Take 1 tablet by mouth daily. , Disp: 3 Package, Rfl: 1  •  Albuterol Sulfate HFA (PROAIR HFA) 108 (90 Base) MCG/ACT Inhalation Aero Soln, Inhale 2 puffs into the lungs every 4 (four) hours as needed for Wheezing.  (Patient not taking: Reported on 3/4/2021 )

## 2021-05-10 ENCOUNTER — OFFICE VISIT (OUTPATIENT)
Dept: INTERNAL MEDICINE CLINIC | Facility: CLINIC | Age: 36
End: 2021-05-10
Payer: MEDICAID

## 2021-05-10 ENCOUNTER — LAB ENCOUNTER (OUTPATIENT)
Dept: LAB | Facility: HOSPITAL | Age: 36
End: 2021-05-10
Attending: NURSE PRACTITIONER
Payer: MEDICAID

## 2021-05-10 VITALS
SYSTOLIC BLOOD PRESSURE: 115 MMHG | HEIGHT: 66 IN | BODY MASS INDEX: 34.39 KG/M2 | WEIGHT: 214 LBS | DIASTOLIC BLOOD PRESSURE: 77 MMHG | HEART RATE: 105 BPM

## 2021-05-10 DIAGNOSIS — N30.00 ACUTE CYSTITIS WITHOUT HEMATURIA: ICD-10-CM

## 2021-05-10 DIAGNOSIS — N30.01 ACUTE CYSTITIS WITH HEMATURIA: Primary | ICD-10-CM

## 2021-05-10 DIAGNOSIS — N89.8 VAGINAL DISCHARGE: ICD-10-CM

## 2021-05-10 DIAGNOSIS — N30.01 ACUTE CYSTITIS WITH HEMATURIA: ICD-10-CM

## 2021-05-10 PROCEDURE — 99214 OFFICE O/P EST MOD 30 MIN: CPT | Performed by: NURSE PRACTITIONER

## 2021-05-10 PROCEDURE — 86780 TREPONEMA PALLIDUM: CPT

## 2021-05-10 PROCEDURE — 87086 URINE CULTURE/COLONY COUNT: CPT

## 2021-05-10 PROCEDURE — 3008F BODY MASS INDEX DOCD: CPT | Performed by: NURSE PRACTITIONER

## 2021-05-10 PROCEDURE — 86803 HEPATITIS C AB TEST: CPT

## 2021-05-10 PROCEDURE — 87340 HEPATITIS B SURFACE AG IA: CPT

## 2021-05-10 PROCEDURE — 3078F DIAST BP <80 MM HG: CPT | Performed by: NURSE PRACTITIONER

## 2021-05-10 PROCEDURE — 87077 CULTURE AEROBIC IDENTIFY: CPT

## 2021-05-10 PROCEDURE — 87591 N.GONORRHOEAE DNA AMP PROB: CPT

## 2021-05-10 PROCEDURE — 87186 SC STD MICRODIL/AGAR DIL: CPT

## 2021-05-10 PROCEDURE — 36415 COLL VENOUS BLD VENIPUNCTURE: CPT

## 2021-05-10 PROCEDURE — 87389 HIV-1 AG W/HIV-1&-2 AB AG IA: CPT

## 2021-05-10 PROCEDURE — 3074F SYST BP LT 130 MM HG: CPT | Performed by: NURSE PRACTITIONER

## 2021-05-10 PROCEDURE — 81001 URINALYSIS AUTO W/SCOPE: CPT

## 2021-05-10 PROCEDURE — 87491 CHLMYD TRACH DNA AMP PROBE: CPT

## 2021-05-10 RX ORDER — CIPROFLOXACIN 500 MG/1
500 TABLET, FILM COATED ORAL 2 TIMES DAILY
Qty: 10 TABLET | Refills: 0 | Status: SHIPPED | OUTPATIENT
Start: 2021-05-10 | End: 2021-05-15

## 2021-05-10 NOTE — ASSESSMENT & PLAN NOTE
A/P 56-year-old female who is 8 weeks postpartum having a yellowish vaginal discharge with urgency to urinate, foul-smelling urine and urine is dark. She recently saw her GYN and her last monthly period was 4/13/2021. Patient is not breast-feeding.   She

## 2021-05-10 NOTE — PROGRESS NOTES
HPI:    Patient ID: Rajni Funes is a 38year old female. HPI Yellow vaginal discharge/UTI Symptoms  Vaginal discharge yellow. Patient having urgency to urinate and she said the color of her urine is very dark.  She recently saw her gynecologist for a use: Not Currently        Alcohol/week: 0.0 standard drinks      Drug use: Never      Sexual activity: Yes        Partners: Male        Birth control/protection: OCP    Other Topics      Concerns:        Caffeine Concern: No         Review of Systems   Con Allergies:  Egg                     RESPIRATORY FAILURE, ANAPHYLAXIS  Seafood                 UNKNOWN  Shellfish               RESPIRATORY FAILURE   PHYSICAL EXAM:   Physical Exam  Constitutional:       Appearance: Normal appearance.    HENT:      Head: postpartum having a yellowish vaginal discharge with urgency to urinate, foul-smelling urine and urine is dark. She recently saw her GYN and her last monthly period was 4/13/2021. Patient is not breast-feeding. She has been sexually active.   She is requ

## 2021-05-11 RX ORDER — METRONIDAZOLE 500 MG/1
500 TABLET ORAL 2 TIMES DAILY
Qty: 14 TABLET | Refills: 0 | Status: SHIPPED | OUTPATIENT
Start: 2021-05-11 | End: 2021-05-18

## 2021-05-12 ENCOUNTER — PATIENT MESSAGE (OUTPATIENT)
Dept: INTERNAL MEDICINE CLINIC | Facility: CLINIC | Age: 36
End: 2021-05-12

## 2021-05-12 ENCOUNTER — TELEPHONE (OUTPATIENT)
Dept: INTERNAL MEDICINE CLINIC | Facility: CLINIC | Age: 36
End: 2021-05-12

## 2021-05-12 NOTE — TELEPHONE ENCOUNTER
From: Lavon Barajas  To: KRISTINE Kingsley  Sent: 5/12/2021 2:52 PM CDT  Subject: Test Results Question    Can I get a prescription for fluconazole? I always get a yeast infection when taking antibiotics.

## 2021-05-12 NOTE — TELEPHONE ENCOUNTER
Follow up phone call to review lab and urine test. No answr. She should be on  Ciprofloxacin 500 mg po BID  Flagyl 500 mg po Twice daily. Message left her her VM.     ARLYN Winn  Working with Cristela Ellis

## 2021-05-13 NOTE — TELEPHONE ENCOUNTER
You can not take fluconazole with Flagyl. You will have a severe reaction. These two medications can not be given together.     ARLYN Lyle  Working with Valeriano Muller

## 2021-09-16 ENCOUNTER — LAB ENCOUNTER (OUTPATIENT)
Dept: LAB | Age: 36
End: 2021-09-16
Attending: INTERNAL MEDICINE
Payer: MEDICAID

## 2021-09-16 ENCOUNTER — OFFICE VISIT (OUTPATIENT)
Dept: INTERNAL MEDICINE CLINIC | Facility: CLINIC | Age: 36
End: 2021-09-16
Payer: MEDICAID

## 2021-09-16 VITALS
WEIGHT: 178 LBS | SYSTOLIC BLOOD PRESSURE: 128 MMHG | BODY MASS INDEX: 28.61 KG/M2 | DIASTOLIC BLOOD PRESSURE: 81 MMHG | HEART RATE: 90 BPM | HEIGHT: 66 IN

## 2021-09-16 DIAGNOSIS — H61.22 IMPACTED CERUMEN OF LEFT EAR: ICD-10-CM

## 2021-09-16 DIAGNOSIS — L98.9 SKIN LESION: ICD-10-CM

## 2021-09-16 DIAGNOSIS — Z00.00 ROUTINE GENERAL MEDICAL EXAMINATION AT A HEALTH CARE FACILITY: Primary | ICD-10-CM

## 2021-09-16 DIAGNOSIS — I83.93 ASYMPTOMATIC VARICOSE VEINS OF BOTH LOWER EXTREMITIES: ICD-10-CM

## 2021-09-16 DIAGNOSIS — Z00.00 ROUTINE GENERAL MEDICAL EXAMINATION AT A HEALTH CARE FACILITY: ICD-10-CM

## 2021-09-16 PROBLEM — Z34.90 PREGNANCY: Status: RESOLVED | Noted: 2021-03-08 | Resolved: 2021-09-16

## 2021-09-16 PROBLEM — N30.00 ACUTE CYSTITIS WITHOUT HEMATURIA: Status: RESOLVED | Noted: 2021-05-10 | Resolved: 2021-09-16

## 2021-09-16 PROBLEM — Z34.90 PREGNANCY (HCC): Status: RESOLVED | Noted: 2021-03-08 | Resolved: 2021-09-16

## 2021-09-16 LAB
ALBUMIN SERPL-MCNC: 4.1 G/DL (ref 3.4–5)
ALBUMIN/GLOB SERPL: 1 {RATIO} (ref 1–2)
ALP LIVER SERPL-CCNC: 49 U/L
ALT SERPL-CCNC: 20 U/L
ANION GAP SERPL CALC-SCNC: 8 MMOL/L (ref 0–18)
AST SERPL-CCNC: 11 U/L (ref 15–37)
BILIRUB SERPL-MCNC: 0.5 MG/DL (ref 0.1–2)
BUN BLD-MCNC: 9 MG/DL (ref 7–18)
BUN/CREAT SERPL: 11.3 (ref 10–20)
CALCIUM BLD-MCNC: 9.2 MG/DL (ref 8.5–10.1)
CHLORIDE SERPL-SCNC: 102 MMOL/L (ref 98–112)
CHOLEST SERPL-MCNC: 216 MG/DL (ref ?–200)
CO2 SERPL-SCNC: 28 MMOL/L (ref 21–32)
CREAT BLD-MCNC: 0.8 MG/DL
DEPRECATED RDW RBC AUTO: 38.4 FL (ref 35.1–46.3)
ERYTHROCYTE [DISTWIDTH] IN BLOOD BY AUTOMATED COUNT: 11.9 % (ref 11–15)
EST. AVERAGE GLUCOSE BLD GHB EST-MCNC: 114 MG/DL (ref 68–126)
GLOBULIN PLAS-MCNC: 4 G/DL (ref 2.8–4.4)
GLUCOSE BLD-MCNC: 83 MG/DL (ref 70–99)
HBA1C MFR BLD HPLC: 5.6 % (ref ?–5.7)
HCT VFR BLD AUTO: 43.9 %
HDLC SERPL-MCNC: 94 MG/DL (ref 40–59)
HGB BLD-MCNC: 14 G/DL
LDLC SERPL CALC-MCNC: 106 MG/DL (ref ?–100)
MCH RBC QN AUTO: 28.2 PG (ref 26–34)
MCHC RBC AUTO-ENTMCNC: 31.9 G/DL (ref 31–37)
MCV RBC AUTO: 88.5 FL
NONHDLC SERPL-MCNC: 122 MG/DL (ref ?–130)
OSMOLALITY SERPL CALC.SUM OF ELEC: 284 MOSM/KG (ref 275–295)
PATIENT FASTING Y/N/NP: YES
PATIENT FASTING Y/N/NP: YES
PLATELET # BLD AUTO: 327 10(3)UL (ref 150–450)
POTASSIUM SERPL-SCNC: 3.5 MMOL/L (ref 3.5–5.1)
PROT SERPL-MCNC: 8.1 G/DL (ref 6.4–8.2)
RBC # BLD AUTO: 4.96 X10(6)UL
SODIUM SERPL-SCNC: 138 MMOL/L (ref 136–145)
T4 FREE SERPL-MCNC: 1.2 NG/DL (ref 0.8–1.7)
TRIGL SERPL-MCNC: 92 MG/DL (ref 30–149)
TSI SER-ACNC: 0.22 MIU/ML (ref 0.36–3.74)
VIT D+METAB SERPL-MCNC: 25.8 NG/ML (ref 30–100)
VLDLC SERPL CALC-MCNC: 15 MG/DL (ref 0–30)
WBC # BLD AUTO: 7 X10(3) UL (ref 4–11)

## 2021-09-16 PROCEDURE — 80053 COMPREHEN METABOLIC PANEL: CPT

## 2021-09-16 PROCEDURE — 84443 ASSAY THYROID STIM HORMONE: CPT

## 2021-09-16 PROCEDURE — 80061 LIPID PANEL: CPT

## 2021-09-16 PROCEDURE — 3008F BODY MASS INDEX DOCD: CPT | Performed by: INTERNAL MEDICINE

## 2021-09-16 PROCEDURE — 84439 ASSAY OF FREE THYROXINE: CPT

## 2021-09-16 PROCEDURE — 81015 MICROSCOPIC EXAM OF URINE: CPT

## 2021-09-16 PROCEDURE — 99395 PREV VISIT EST AGE 18-39: CPT | Performed by: INTERNAL MEDICINE

## 2021-09-16 PROCEDURE — 82306 VITAMIN D 25 HYDROXY: CPT

## 2021-09-16 PROCEDURE — 3079F DIAST BP 80-89 MM HG: CPT | Performed by: INTERNAL MEDICINE

## 2021-09-16 PROCEDURE — 85027 COMPLETE CBC AUTOMATED: CPT

## 2021-09-16 PROCEDURE — 36415 COLL VENOUS BLD VENIPUNCTURE: CPT

## 2021-09-16 PROCEDURE — 3074F SYST BP LT 130 MM HG: CPT | Performed by: INTERNAL MEDICINE

## 2021-09-16 PROCEDURE — 83036 HEMOGLOBIN GLYCOSYLATED A1C: CPT

## 2021-09-17 PROBLEM — O09.521 MULTIGRAVIDA OF ADVANCED MATERNAL AGE IN FIRST TRIMESTER (HCC): Status: RESOLVED | Noted: 2020-08-05 | Resolved: 2021-09-17

## 2021-09-17 PROBLEM — O09.521 MULTIGRAVIDA OF ADVANCED MATERNAL AGE IN FIRST TRIMESTER: Status: RESOLVED | Noted: 2020-08-05 | Resolved: 2021-09-17

## 2021-09-17 PROBLEM — B95.1 POSITIVE GBS TEST: Status: RESOLVED | Noted: 2021-02-18 | Resolved: 2021-09-17

## 2021-09-17 NOTE — PROGRESS NOTES
HPI:    Patient ID: Darylene Flash is a 43year old female.     HPI    Physical exam  Generally healthy    /81 (BP Location: Right arm, Patient Position: Sitting, Cuff Size: adult)   Pulse 90   Ht 5' 6\" (1.676 m)   Wt 178 lb (80.7 kg)   LMP 09/08/20 (four) hours as needed for Wheezing.  1 Inhaler 6     Allergies:  Egg                     RESPIRATORY FAILURE, ANAPHYLAXIS  Seafood                 UNKNOWN  Shellfish               RESPIRATORY FAILURE    HISTORY:  Past Medical History:   Diagnosis Date   • normal.      Pupils: Pupils are equal, round, and reactive to light. Neck:      Thyroid: No thyromegaly. Cardiovascular:      Rate and Rhythm: Normal rate and regular rhythm.       Heart sounds: Normal heart sounds, S1 normal and S2 normal. No murmur he Thyroxine)      Lipid Panel      CBC, Platelet;  No Differential      Comp Metabolic Panel (14)      Hemoglobin A1C      Urine Microscopic w Reflex CULTURE      Vitamin D [E]      Meds This Visit:  Requested Prescriptions      No prescriptions requested or

## 2021-09-21 ENCOUNTER — OFFICE VISIT (OUTPATIENT)
Dept: OTOLARYNGOLOGY | Facility: CLINIC | Age: 36
End: 2021-09-21
Payer: MEDICAID

## 2021-09-21 VITALS — WEIGHT: 178 LBS | HEIGHT: 66 IN | BODY MASS INDEX: 28.61 KG/M2

## 2021-09-21 DIAGNOSIS — H61.22 IMPACTED CERUMEN OF LEFT EAR: Primary | ICD-10-CM

## 2021-09-21 PROCEDURE — 69210 REMOVE IMPACTED EAR WAX UNI: CPT | Performed by: OTOLARYNGOLOGY

## 2021-09-21 PROCEDURE — 3008F BODY MASS INDEX DOCD: CPT | Performed by: OTOLARYNGOLOGY

## 2021-09-21 NOTE — PROGRESS NOTES
Willis Morin is a 43year old female. Patient presents with:  Ear Problem: pt is here today for having a plugged left ear.  She does not have any discharge, but she was having alot of pain but it is not as bad now 4/10      HISTORY OF PRESENT ILLNESS changes. Respiratory Negative Dyspnea and wheezing. Cardio Negative Chest pain, irregular heartbeat/palpitations and syncope. GI Negative Abdominal pain and diarrhea. Endocrine Negative Cold intolerance and heat intolerance.    Neuro Negative Tremor 6  ASSESSMENT AND PLAN    1. Impacted cerumen of left ear    - REMOVAL IMPACTED CERUMEN REQUIRING INSTRUMENTATION, UNILATERAL      All cerumen was removed using microscopy.  I have asked the patient to return to see me as needed for repeat cerumen removal i

## 2021-10-04 ENCOUNTER — OFFICE VISIT (OUTPATIENT)
Dept: OBGYN CLINIC | Facility: CLINIC | Age: 36
End: 2021-10-04
Payer: MEDICAID

## 2021-10-04 VITALS
BODY MASS INDEX: 29 KG/M2 | HEART RATE: 116 BPM | WEIGHT: 177 LBS | DIASTOLIC BLOOD PRESSURE: 75 MMHG | SYSTOLIC BLOOD PRESSURE: 115 MMHG

## 2021-10-04 DIAGNOSIS — Z76.0 MEDICATION REFILL: ICD-10-CM

## 2021-10-04 DIAGNOSIS — Z11.3 SCREENING FOR STD (SEXUALLY TRANSMITTED DISEASE): ICD-10-CM

## 2021-10-04 DIAGNOSIS — Z01.419 WELL WOMAN EXAM: Primary | ICD-10-CM

## 2021-10-04 PROCEDURE — 3078F DIAST BP <80 MM HG: CPT | Performed by: OBSTETRICS & GYNECOLOGY

## 2021-10-04 PROCEDURE — 3074F SYST BP LT 130 MM HG: CPT | Performed by: OBSTETRICS & GYNECOLOGY

## 2021-10-04 PROCEDURE — 99395 PREV VISIT EST AGE 18-39: CPT | Performed by: OBSTETRICS & GYNECOLOGY

## 2021-10-04 RX ORDER — DESOGESTREL AND ETHINYL ESTRADIOL 0.15-0.03
1 KIT ORAL DAILY
Qty: 3 EACH | Refills: 3 | Status: SHIPPED | OUTPATIENT
Start: 2021-10-04

## 2021-10-04 NOTE — H&P
HPI:  The patient is a 42 yo F here for WWE. Cycles monthly with 4-5 days flow. Getting  ( cheating). Wants STD screen. On OCPs.       LPS:  12/30/19 pap/hpv neg    Reviewed medical and surgical history below     Patient's last menstrua Blood Transfusions: Not Asked        Caffeine Concern: No        Occupational Exposure: Not Asked        Hobby Hazards: Not Asked        Sleep Concern: Not Asked        Stress Concern: Not Asked        Weight Concern: Not Asked        Special Diet: Not Ask (Other[other]) Brother         asthma, eczema       MEDICATIONS:    Current Outpatient Medications:   •  Multiple Vitamin (MULTIVITAMIN ADULT OR), Take by mouth., Disp: , Rfl:   •  Desogestrel-Ethinyl Estradiol (ISIBLOOM) 0.15-30 MG-MCG Oral Tab, Take 1 ta motion  Uterus: normal in size, contour, position, mobility, without tenderness  Adnexa: normal without masses or tenderness  Perineum: normal    Assessment/Plan:  Bulmaro Galvan was seen today for gyn exam and medication request.    Diagnoses and all orders for t

## 2021-10-20 ENCOUNTER — LAB REQUISITION (OUTPATIENT)
Dept: LAB | Age: 36
End: 2021-10-20

## 2021-10-20 DIAGNOSIS — Z11.3 ENCOUNTER FOR SCREENING FOR INFECTIONS WITH A PREDOMINANTLY SEXUAL MODE OF TRANSMISSION: ICD-10-CM

## 2021-10-20 PROCEDURE — 87591 N.GONORRHOEAE DNA AMP PROB: CPT | Performed by: CLINICAL MEDICAL LABORATORY

## 2021-10-20 PROCEDURE — 87661 TRICHOMONAS VAGINALIS AMPLIF: CPT | Performed by: CLINICAL MEDICAL LABORATORY

## 2021-10-20 PROCEDURE — 87491 CHLMYD TRACH DNA AMP PROBE: CPT | Performed by: CLINICAL MEDICAL LABORATORY

## 2021-10-20 PROCEDURE — 87510 GARDNER VAG DNA DIR PROBE: CPT | Performed by: CLINICAL MEDICAL LABORATORY

## 2021-10-20 PROCEDURE — 87480 CANDIDA DNA DIR PROBE: CPT | Performed by: CLINICAL MEDICAL LABORATORY

## 2021-10-20 PROCEDURE — 87660 TRICHOMONAS VAGIN DIR PROBE: CPT | Performed by: CLINICAL MEDICAL LABORATORY

## 2021-10-21 ENCOUNTER — LAB REQUISITION (OUTPATIENT)
Dept: LAB | Age: 36
End: 2021-10-21

## 2021-10-21 DIAGNOSIS — N39.0 URINARY TRACT INFECTION, SITE NOT SPECIFIED: ICD-10-CM

## 2021-10-21 LAB
C TRACH RRNA UR QL NAA+PROBE: NEGATIVE
CANDIDA RRNA VAG QL PROBE: POSITIVE
G VAGINALIS RRNA GENITAL QL PROBE: POSITIVE
Lab: NORMAL
N GONORRHOEA RRNA UR QL NAA+PROBE: NEGATIVE
T VAGINALIS RRNA GENITAL QL PROBE: NEGATIVE
T VAGINALIS RRNA UR QL NAA+PROBE: NEGATIVE

## 2021-10-21 PROCEDURE — 87086 URINE CULTURE/COLONY COUNT: CPT | Performed by: CLINICAL MEDICAL LABORATORY

## 2021-10-22 LAB — BACTERIA UR CULT: NORMAL

## 2021-10-29 ENCOUNTER — TELEPHONE (OUTPATIENT)
Dept: OBGYN CLINIC | Facility: CLINIC | Age: 36
End: 2021-10-29

## 2021-10-29 DIAGNOSIS — Z11.3 SCREENING FOR STD (SEXUALLY TRANSMITTED DISEASE): Primary | ICD-10-CM

## 2021-10-29 NOTE — TELEPHONE ENCOUNTER
----- Message from Gisel Patiño DO sent at 10/28/2021  9:42 PM CDT -----  TPAL inconclusive.   Lab recommends repeat test in 2-4 weeks

## 2022-01-13 ENCOUNTER — OFFICE VISIT (OUTPATIENT)
Dept: INTERNAL MEDICINE CLINIC | Facility: CLINIC | Age: 37
End: 2022-01-13
Payer: MEDICAID

## 2022-01-13 VITALS
DIASTOLIC BLOOD PRESSURE: 80 MMHG | SYSTOLIC BLOOD PRESSURE: 124 MMHG | WEIGHT: 182 LBS | BODY MASS INDEX: 29.25 KG/M2 | HEIGHT: 66 IN | HEART RATE: 88 BPM

## 2022-01-13 DIAGNOSIS — N89.8 VAGINAL DISCHARGE: ICD-10-CM

## 2022-01-13 DIAGNOSIS — N89.8 VAGINAL ITCHING: Primary | ICD-10-CM

## 2022-01-13 DIAGNOSIS — Z11.3 SCREENING FOR STD (SEXUALLY TRANSMITTED DISEASE): ICD-10-CM

## 2022-01-13 DIAGNOSIS — E05.90 SUBCLINICAL HYPERTHYROIDISM: ICD-10-CM

## 2022-01-13 PROCEDURE — 3074F SYST BP LT 130 MM HG: CPT | Performed by: PHYSICIAN ASSISTANT

## 2022-01-13 PROCEDURE — 3079F DIAST BP 80-89 MM HG: CPT | Performed by: PHYSICIAN ASSISTANT

## 2022-01-13 PROCEDURE — 99213 OFFICE O/P EST LOW 20 MIN: CPT | Performed by: PHYSICIAN ASSISTANT

## 2022-01-13 PROCEDURE — 3008F BODY MASS INDEX DOCD: CPT | Performed by: PHYSICIAN ASSISTANT

## 2022-01-13 NOTE — PROGRESS NOTES
HPI:    Patient ID: Consuelo Beauchamp is a 43year old female. The patient presents with sx of vaginal itch and burning since yesterday. Has a hx of yeast infections and BV.  States her burning primarily is external toward BL labia  Would also like std nestor Used    Alcohol use: Not Currently      Alcohol/week: 0.0 standard drinks    Past Surgical History:   Procedure Laterality Date   •      • OTHER SURGICAL HISTORY      excision nasal mass, benign   \  Family History   Problem Relation Age of Onset   • D TRIIODOTHYRONINE (T3) TOTAL    Orders Placed This Encounter      HCV Antibody      Hepatitis B Surface Antigen      HIV AG AB Combo      T PALLIDUM SCREENING CASCADE      TSH W REFLEX TO FREE T4 [63404][Q]      TRIIODOTHYRONINE (T3) TOTAL [859][Q]      Gen

## 2022-01-14 LAB
CHLAMYDIA TRACHOMATIS$RNA, TMA: NOT DETECTED
NEISSERIA GONORRHOEAE$RNA, TMA: NOT DETECTED
SIGNAL TO CUT-OFF: 0.02
T3, TOTAL: 152 NG/DL (ref 76–181)
TSH W/REFLEX TO FT4: 0.46 MIU/L

## 2022-01-15 LAB
GENITAL VAGINOSIS SCREEN: NEGATIVE
TRICHOMONAS SCREEN: NEGATIVE

## 2022-01-31 NOTE — TELEPHONE ENCOUNTER
Letter created by Dr Aracelis Wan and sent via my chart . Price (Do Not Change): 0.00 Detail Level: Zone Instructions: This plan will send the code FBSD to the PM system.  DO NOT or CHANGE the price.

## 2022-04-07 ENCOUNTER — TELEPHONE (OUTPATIENT)
Dept: INTERNAL MEDICINE CLINIC | Facility: CLINIC | Age: 37
End: 2022-04-07

## 2022-04-07 NOTE — TELEPHONE ENCOUNTER
Patient is requesting a copy of her latest physical and immunizations be uploaded to her mychart. 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 Please advise.

## 2022-04-07 NOTE — TELEPHONE ENCOUNTER
Pt sent [employer] message requesting if she can have her last px and her vaccination records sent to her [employer] because she needs them for her school. Please advise.

## 2022-04-08 NOTE — TELEPHONE ENCOUNTER
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

## 2022-04-20 ENCOUNTER — OFFICE VISIT (OUTPATIENT)
Dept: INTERNAL MEDICINE CLINIC | Facility: CLINIC | Age: 37
End: 2022-04-20
Payer: MEDICAID

## 2022-04-20 VITALS
WEIGHT: 175 LBS | DIASTOLIC BLOOD PRESSURE: 83 MMHG | SYSTOLIC BLOOD PRESSURE: 128 MMHG | HEIGHT: 66 IN | BODY MASS INDEX: 28.13 KG/M2 | HEART RATE: 86 BPM

## 2022-04-20 DIAGNOSIS — R49.0 HOARSENESS: ICD-10-CM

## 2022-04-20 DIAGNOSIS — N76.1 SUBACUTE VAGINITIS: Primary | ICD-10-CM

## 2022-04-20 PROCEDURE — 3074F SYST BP LT 130 MM HG: CPT | Performed by: INTERNAL MEDICINE

## 2022-04-20 PROCEDURE — 3079F DIAST BP 80-89 MM HG: CPT | Performed by: INTERNAL MEDICINE

## 2022-04-20 PROCEDURE — 99214 OFFICE O/P EST MOD 30 MIN: CPT | Performed by: INTERNAL MEDICINE

## 2022-04-20 PROCEDURE — 3008F BODY MASS INDEX DOCD: CPT | Performed by: INTERNAL MEDICINE

## 2022-04-20 RX ORDER — METRONIDAZOLE 7.5 MG/G
GEL VAGINAL
Qty: 1 EACH | Refills: 0 | Status: SHIPPED | OUTPATIENT
Start: 2022-04-20

## 2022-04-20 RX ORDER — FLUCONAZOLE 150 MG/1
TABLET ORAL
Qty: 3 TABLET | Refills: 2 | Status: SHIPPED | OUTPATIENT
Start: 2022-04-20

## 2022-04-22 LAB
GENITAL VAGINOSIS SCREEN: NEGATIVE
TRICHOMONAS SCREEN: NEGATIVE

## 2022-07-15 ENCOUNTER — TELEMEDICINE (OUTPATIENT)
Dept: INTERNAL MEDICINE CLINIC | Facility: CLINIC | Age: 37
End: 2022-07-15

## 2022-07-15 DIAGNOSIS — J01.40 ACUTE NON-RECURRENT PANSINUSITIS: Primary | ICD-10-CM

## 2022-07-15 PROCEDURE — 99213 OFFICE O/P EST LOW 20 MIN: CPT | Performed by: NURSE PRACTITIONER

## 2022-07-15 RX ORDER — AZITHROMYCIN 250 MG/1
TABLET, FILM COATED ORAL
Qty: 6 TABLET | Refills: 0 | Status: SHIPPED | OUTPATIENT
Start: 2022-07-15 | End: 2022-07-20

## 2022-08-26 RX ORDER — ALBUTEROL SULFATE 90 UG/1
2 AEROSOL, METERED RESPIRATORY (INHALATION) EVERY 4 HOURS PRN
Qty: 3 EACH | Refills: 1 | Status: SHIPPED | OUTPATIENT
Start: 2022-08-26

## 2022-08-26 NOTE — TELEPHONE ENCOUNTER
Refill passed per [address] protocol.    Requested Prescriptions   Pending Prescriptions Disp Refills    ALBUTEROL 108 (90 Base) MCG/ACT Inhalation Aero Soln [Pharmacy Med Name: ALBUTEROL HFA INH (200 PUFFS)8.5GM] 8.5 g 0     Sig: INHALE 2 PUFFS INTO THE LUNGS EVERY 4 HOURS AS NEEDED FOR WHEEZING        Asthma & COPD Medication Protocol Passed - 8/26/2022  3:07 AM        Passed - In person appointment or virtual visit in the past 6 mos or appointment in next 3 mos       Recent Outpatient Visits              1 month ago Acute non-recurrent pansinusitis    [address], [address] Tawanda Jimenez APRN    Telemedicine    4 months ago Subacute vaginitis    Junior Rufino MD    Office Visit    7 months ago Vaginal itching    [address]    [facility] Visit    10 months ago Well woman exam    [facility] [place], [address], [facility]    Office Visit    11 months ago Impacted cerumen of left ear    [facility], [address], Rula Lamar MD    Office Visit     Future Appointments         Provider Department Appt Notes    In 3 weeks Ruth Pierre MD [address], [address] Alcon Jimenez Annual Physical  last px 9/16/21    In 1 month JaquelinZe Nassar [address] [place] [address], 59 [facility]                   Recent Outpatient Visits              1 month ago Acute non-recurrent pansinusitis    [address], [address] Aneta Jimenez [place], APRKOBE    Telemedicine    4 months ago Subacute vaginitis    Junior Rufino MD    Office Visit    7 months ago Vaginal itching    [address], [address] [facility]    Office Visit    10 months ago Well woman exam    [facility] [place], [address], [facility] Office Visit    11 months ago Impacted cerumen of left ear    [facility], [address], Dusty Lamar MD    Office Visit          Future Appointments         Provider Department Appt Notes    In 3 weeks Fanny Burgess MD [address] [facility], [facility] Annual Physical  last px 9/16/21    In 1 month Poly Harman, Ze Schmitz [facility], 59 [facility]

## 2022-10-04 RX ORDER — DESOGESTREL AND ETHINYL ESTRADIOL 0.15-0.03
KIT ORAL
Qty: 84 TABLET | Refills: 0 | OUTPATIENT
Start: 2022-10-04

## 2022-10-04 RX ORDER — DESOGESTREL AND ETHINYL ESTRADIOL 0.15-0.03
1 KIT ORAL DAILY
Qty: 3 EACH | Refills: 0 | Status: SHIPPED | OUTPATIENT
Start: 2022-10-04

## 2022-10-17 ENCOUNTER — TELEPHONE (OUTPATIENT)
Dept: INTERNAL MEDICINE CLINIC | Facility: CLINIC | Age: 37
End: 2022-10-17

## 2022-10-17 ENCOUNTER — OFFICE VISIT (OUTPATIENT)
Dept: INTERNAL MEDICINE CLINIC | Facility: CLINIC | Age: 37
End: 2022-10-17
Payer: MEDICAID

## 2022-10-17 ENCOUNTER — PATIENT MESSAGE (OUTPATIENT)
Dept: INTERNAL MEDICINE CLINIC | Facility: CLINIC | Age: 37
End: 2022-10-17

## 2022-10-17 VITALS
HEIGHT: 66 IN | DIASTOLIC BLOOD PRESSURE: 82 MMHG | SYSTOLIC BLOOD PRESSURE: 125 MMHG | BODY MASS INDEX: 29.89 KG/M2 | WEIGHT: 186 LBS | RESPIRATION RATE: 16 BRPM | HEART RATE: 94 BPM

## 2022-10-17 DIAGNOSIS — J45.31 MILD PERSISTENT ASTHMA WITH ACUTE EXACERBATION: ICD-10-CM

## 2022-10-17 DIAGNOSIS — J01.10 ACUTE NON-RECURRENT FRONTAL SINUSITIS: ICD-10-CM

## 2022-10-17 DIAGNOSIS — J45.31 MILD PERSISTENT ASTHMA WITH ACUTE EXACERBATION: Primary | ICD-10-CM

## 2022-10-17 PROCEDURE — 99214 OFFICE O/P EST MOD 30 MIN: CPT | Performed by: NURSE PRACTITIONER

## 2022-10-17 PROCEDURE — 3074F SYST BP LT 130 MM HG: CPT | Performed by: NURSE PRACTITIONER

## 2022-10-17 PROCEDURE — 3079F DIAST BP 80-89 MM HG: CPT | Performed by: NURSE PRACTITIONER

## 2022-10-17 PROCEDURE — 3008F BODY MASS INDEX DOCD: CPT | Performed by: NURSE PRACTITIONER

## 2022-10-17 RX ORDER — ALBUTEROL SULFATE 2.5 MG/3ML
2.5 SOLUTION RESPIRATORY (INHALATION) EVERY 4 HOURS PRN
Qty: 100 EACH | Refills: 3 | Status: SHIPPED | OUTPATIENT
Start: 2022-10-17

## 2022-10-17 RX ORDER — AMOXICILLIN AND CLAVULANATE POTASSIUM 875; 125 MG/1; MG/1
1 TABLET, FILM COATED ORAL 2 TIMES DAILY
Qty: 20 TABLET | Refills: 0 | Status: SHIPPED | OUTPATIENT
Start: 2022-10-17

## 2022-10-17 RX ORDER — AMOXICILLIN AND CLAVULANATE POTASSIUM 875; 125 MG/1; MG/1
1 TABLET, FILM COATED ORAL 2 TIMES DAILY
Qty: 20 TABLET | Refills: 0 | Status: SHIPPED | OUTPATIENT
Start: 2022-10-17 | End: 2022-10-17

## 2022-10-17 RX ORDER — ALBUTEROL SULFATE 90 UG/1
2 AEROSOL, METERED RESPIRATORY (INHALATION) EVERY 4 HOURS PRN
Qty: 3 EACH | Refills: 1 | Status: SHIPPED | OUTPATIENT
Start: 2022-10-17

## 2022-10-17 RX ORDER — ALBUTEROL SULFATE 2.5 MG/3ML
2.5 SOLUTION RESPIRATORY (INHALATION) EVERY 4 HOURS PRN
Qty: 25 EACH | Refills: 3 | Status: SHIPPED | OUTPATIENT
Start: 2022-10-17 | End: 2022-10-17

## 2022-10-17 RX ORDER — PREDNISONE 20 MG/1
TABLET ORAL
Qty: 10 TABLET | Refills: 0 | Status: SHIPPED | OUTPATIENT
Start: 2022-10-17

## 2022-10-17 RX ORDER — ALBUTEROL SULFATE 2.5 MG/3ML
2.5 SOLUTION RESPIRATORY (INHALATION) EVERY 4 HOURS PRN
Qty: 1 EACH | Refills: 3 | Status: SHIPPED | OUTPATIENT
Start: 2022-10-17 | End: 2022-10-17

## 2022-10-18 NOTE — TELEPHONE ENCOUNTER
See medication question encounter 10/17/22. Revised prescriptions sent today. From: Kamran Qiu  To: KRISTINE Boateng  Sent: 10/17/2022  4:29 PM CDT  Subject: Medication    HiRemigio called me and informed me that they are having a problem with filling the prescriptions for the antibiotics and the Albuterol for the neburalizer machine due to no quantity.

## 2022-11-22 ENCOUNTER — OFFICE VISIT (OUTPATIENT)
Dept: INTERNAL MEDICINE CLINIC | Facility: CLINIC | Age: 37
End: 2022-11-22
Payer: MEDICAID

## 2022-11-22 ENCOUNTER — LAB ENCOUNTER (OUTPATIENT)
Dept: LAB | Age: 37
End: 2022-11-22
Attending: INTERNAL MEDICINE
Payer: MEDICAID

## 2022-11-22 VITALS
HEIGHT: 66 IN | SYSTOLIC BLOOD PRESSURE: 138 MMHG | DIASTOLIC BLOOD PRESSURE: 89 MMHG | BODY MASS INDEX: 28.61 KG/M2 | WEIGHT: 178 LBS | HEART RATE: 83 BPM

## 2022-11-22 DIAGNOSIS — Z00.00 PHYSICAL EXAM: ICD-10-CM

## 2022-11-22 DIAGNOSIS — Z00.00 PHYSICAL EXAM: Primary | ICD-10-CM

## 2022-11-22 DIAGNOSIS — Z12.4 SCREENING FOR CERVICAL CANCER: ICD-10-CM

## 2022-11-22 DIAGNOSIS — J45.31 MILD PERSISTENT ASTHMA WITH ACUTE EXACERBATION: ICD-10-CM

## 2022-11-22 LAB
ALBUMIN SERPL-MCNC: 3.8 G/DL (ref 3.4–5)
ALBUMIN/GLOB SERPL: 1 {RATIO} (ref 1–2)
ALP LIVER SERPL-CCNC: 41 U/L
ALT SERPL-CCNC: 24 U/L
ANION GAP SERPL CALC-SCNC: 4 MMOL/L (ref 0–18)
AST SERPL-CCNC: 17 U/L (ref 15–37)
BASOPHILS # BLD AUTO: 0.03 X10(3) UL (ref 0–0.2)
BASOPHILS NFR BLD AUTO: 0.5 %
BILIRUB SERPL-MCNC: 0.3 MG/DL (ref 0.1–2)
BILIRUB UR QL: NEGATIVE
BUN BLD-MCNC: 11 MG/DL (ref 7–18)
BUN/CREAT SERPL: 13.4 (ref 10–20)
CALCIUM BLD-MCNC: 9.1 MG/DL (ref 8.5–10.1)
CHLORIDE SERPL-SCNC: 102 MMOL/L (ref 98–112)
CHOLEST SERPL-MCNC: 171 MG/DL (ref ?–200)
CLARITY UR: CLEAR
CO2 SERPL-SCNC: 30 MMOL/L (ref 21–32)
COLOR UR: YELLOW
CREAT BLD-MCNC: 0.82 MG/DL
DEPRECATED RDW RBC AUTO: 36.3 FL (ref 35.1–46.3)
EOSINOPHIL # BLD AUTO: 0.11 X10(3) UL (ref 0–0.7)
EOSINOPHIL NFR BLD AUTO: 1.8 %
ERYTHROCYTE [DISTWIDTH] IN BLOOD BY AUTOMATED COUNT: 11.3 % (ref 11–15)
EST. AVERAGE GLUCOSE BLD GHB EST-MCNC: 105 MG/DL (ref 68–126)
FASTING PATIENT LIPID ANSWER: YES
FASTING STATUS PATIENT QL REPORTED: YES
GFR SERPLBLD BASED ON 1.73 SQ M-ARVRAT: 93 ML/MIN/1.73M2 (ref 60–?)
GLOBULIN PLAS-MCNC: 4 G/DL (ref 2.8–4.4)
GLUCOSE BLD-MCNC: 87 MG/DL (ref 70–99)
GLUCOSE UR-MCNC: NEGATIVE MG/DL
HBA1C MFR BLD: 5.3 % (ref ?–5.7)
HCT VFR BLD AUTO: 42.8 %
HDLC SERPL-MCNC: 90 MG/DL (ref 40–59)
HGB BLD-MCNC: 14 G/DL
HGB UR QL STRIP.AUTO: NEGATIVE
IMM GRANULOCYTES # BLD AUTO: 0.01 X10(3) UL (ref 0–1)
IMM GRANULOCYTES NFR BLD: 0.2 %
KETONES UR-MCNC: NEGATIVE MG/DL
LDLC SERPL CALC-MCNC: 64 MG/DL (ref ?–100)
LEUKOCYTE ESTERASE UR QL STRIP.AUTO: NEGATIVE
LYMPHOCYTES # BLD AUTO: 1.85 X10(3) UL (ref 1–4)
LYMPHOCYTES NFR BLD AUTO: 29.7 %
MCH RBC QN AUTO: 28.8 PG (ref 26–34)
MCHC RBC AUTO-ENTMCNC: 32.7 G/DL (ref 31–37)
MCV RBC AUTO: 88.1 FL
MONOCYTES # BLD AUTO: 0.5 X10(3) UL (ref 0.1–1)
MONOCYTES NFR BLD AUTO: 8 %
NEUTROPHILS # BLD AUTO: 3.72 X10 (3) UL (ref 1.5–7.7)
NEUTROPHILS # BLD AUTO: 3.72 X10(3) UL (ref 1.5–7.7)
NEUTROPHILS NFR BLD AUTO: 59.8 %
NITRITE UR QL STRIP.AUTO: NEGATIVE
NONHDLC SERPL-MCNC: 81 MG/DL (ref ?–130)
OSMOLALITY SERPL CALC.SUM OF ELEC: 281 MOSM/KG (ref 275–295)
PH UR: 7.5 [PH] (ref 5–8)
PLATELET # BLD AUTO: 297 10(3)UL (ref 150–450)
POTASSIUM SERPL-SCNC: 3.6 MMOL/L (ref 3.5–5.1)
PROT SERPL-MCNC: 7.8 G/DL (ref 6.4–8.2)
PROT UR-MCNC: NEGATIVE MG/DL
RBC # BLD AUTO: 4.86 X10(6)UL
SODIUM SERPL-SCNC: 136 MMOL/L (ref 136–145)
SP GR UR STRIP: 1.02 (ref 1–1.03)
T4 FREE SERPL-MCNC: 1.1 NG/DL (ref 0.8–1.7)
TRIGL SERPL-MCNC: 93 MG/DL (ref 30–149)
TSI SER-ACNC: 0.67 MIU/ML (ref 0.36–3.74)
UROBILINOGEN UR STRIP-ACNC: 0.2
VLDLC SERPL CALC-MCNC: 14 MG/DL (ref 0–30)
WBC # BLD AUTO: 6.2 X10(3) UL (ref 4–11)

## 2022-11-22 PROCEDURE — 80061 LIPID PANEL: CPT

## 2022-11-22 PROCEDURE — 84439 ASSAY OF FREE THYROXINE: CPT

## 2022-11-22 PROCEDURE — 3008F BODY MASS INDEX DOCD: CPT | Performed by: INTERNAL MEDICINE

## 2022-11-22 PROCEDURE — 80053 COMPREHEN METABOLIC PANEL: CPT

## 2022-11-22 PROCEDURE — 84443 ASSAY THYROID STIM HORMONE: CPT

## 2022-11-22 PROCEDURE — 81003 URINALYSIS AUTO W/O SCOPE: CPT

## 2022-11-22 PROCEDURE — 85025 COMPLETE CBC W/AUTO DIFF WBC: CPT

## 2022-11-22 PROCEDURE — 90677 PCV20 VACCINE IM: CPT | Performed by: INTERNAL MEDICINE

## 2022-11-22 PROCEDURE — 83036 HEMOGLOBIN GLYCOSYLATED A1C: CPT

## 2022-11-22 PROCEDURE — 99395 PREV VISIT EST AGE 18-39: CPT | Performed by: INTERNAL MEDICINE

## 2022-11-22 PROCEDURE — 36415 COLL VENOUS BLD VENIPUNCTURE: CPT

## 2022-11-22 PROCEDURE — 3079F DIAST BP 80-89 MM HG: CPT | Performed by: INTERNAL MEDICINE

## 2022-11-22 PROCEDURE — 3075F SYST BP GE 130 - 139MM HG: CPT | Performed by: INTERNAL MEDICINE

## 2022-11-22 PROCEDURE — 90471 IMMUNIZATION ADMIN: CPT | Performed by: INTERNAL MEDICINE

## 2022-11-22 RX ORDER — ALBUTEROL SULFATE 90 UG/1
2 AEROSOL, METERED RESPIRATORY (INHALATION) EVERY 4 HOURS PRN
Qty: 3 EACH | Refills: 1 | Status: SHIPPED | OUTPATIENT
Start: 2022-11-22

## 2022-12-06 ENCOUNTER — PATIENT MESSAGE (OUTPATIENT)
Dept: INTERNAL MEDICINE CLINIC | Facility: CLINIC | Age: 37
End: 2022-12-06

## 2022-12-07 NOTE — TELEPHONE ENCOUNTER
Eda Green, RN 12/7/2022 10:30 AM CST        ----- Message -----  From: Tyson Miles  Sent: 12/6/2022 2:02 PM CST  To: Em Rn Triage  Subject: Work physical form     Hi Dr. Pauilne Stein,    Can you please complete this work physical for me at your convenience? 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 I attached the form to this message. Can you scan it and send it back to me? Thanks in advance.

## 2022-12-08 ENCOUNTER — MED REC SCAN ONLY (OUTPATIENT)
Dept: INTERNAL MEDICINE CLINIC | Facility: CLINIC | Age: 37
End: 2022-12-08

## 2022-12-10 ENCOUNTER — OFFICE VISIT (OUTPATIENT)
Dept: INTERNAL MEDICINE CLINIC | Facility: CLINIC | Age: 37
End: 2022-12-10
Payer: MEDICAID

## 2022-12-10 VITALS
HEART RATE: 87 BPM | BODY MASS INDEX: 28.61 KG/M2 | OXYGEN SATURATION: 100 % | WEIGHT: 178 LBS | SYSTOLIC BLOOD PRESSURE: 128 MMHG | DIASTOLIC BLOOD PRESSURE: 78 MMHG | HEIGHT: 66 IN

## 2022-12-10 DIAGNOSIS — Z12.4 SCREENING FOR CERVICAL CANCER: ICD-10-CM

## 2022-12-10 DIAGNOSIS — Z12.4 CERVICAL CANCER SCREENING: Primary | ICD-10-CM

## 2022-12-10 PROCEDURE — 87624 HPV HI-RISK TYP POOLED RSLT: CPT | Performed by: NURSE PRACTITIONER

## 2022-12-10 RX ORDER — DESOGESTREL AND ETHINYL ESTRADIOL 0.15-0.03
1 KIT ORAL DAILY
Qty: 3 EACH | Refills: 0 | Status: SHIPPED | OUTPATIENT
Start: 2022-12-10

## 2022-12-10 NOTE — ASSESSMENT & PLAN NOTE
Patient has been on birth control medication for over 20 years. She is considering alternative method. She does want her prescription filled today and is here for a Pap smear. Normal pelvic exam.  She is encouraging her  to get a vasectomy.     Plan  PAP

## 2022-12-12 LAB — HPV I/H RISK 1 DNA SPEC QL NAA+PROBE: NEGATIVE

## 2022-12-13 NOTE — TELEPHONE ENCOUNTER
Patient called back, verified name/. Asking if her work physical form has been completed. Please advise? Made aware that Dr Aki Patel is out of the office today.

## 2022-12-15 ENCOUNTER — PATIENT MESSAGE (OUTPATIENT)
Dept: INTERNAL MEDICINE CLINIC | Facility: CLINIC | Age: 37
End: 2022-12-15

## 2022-12-15 NOTE — TELEPHONE ENCOUNTER
Left message for patient to  form as not secure fax    [employer] also sent    Secure chat with Fara Ayon to get form completed as patient has been asking for forms since 12/6/22    RN called both ev huff and corby to locate forms    Patient stated her acceptance for a new job is depended on these forms and she was angry that we could not complete forms on time, has been getting the run around for the last couple of weeks. Thanks to the office staff and Fara Ayon for understanding and assisting with forms.

## 2022-12-16 NOTE — TELEPHONE ENCOUNTER
Form completed by Duarte CARMONA, faxed to patient's HR department () as requested by patient in message below, pending confirmation

## 2022-12-16 NOTE — TELEPHONE ENCOUNTER
Verified name and . Patient states that needs form completed and faxed to the HR at prospective employer's office by 5:00 pm today or else she will have to forfeit the job offer. She cannot come in to the office to  the form because she will be chaperoning her daughter's school field trip. Patient states she consents to releasing her information to the following fax number- she states she has never had an issue with getting information faxed in the past.    She is requesting the completed form to be faxed:  Attn: Osman Barrera  Fax #:[phone]    Office staff, please assist and thank you.

## 2022-12-16 NOTE — TELEPHONE ENCOUNTER
Patient notified that form has been faxed to [address] She noted understanding, no further action required.

## 2022-12-29 ENCOUNTER — TELEPHONE (OUTPATIENT)
Dept: OBGYN CLINIC | Facility: CLINIC | Age: 37
End: 2022-12-29

## 2022-12-29 RX ORDER — DESOGESTREL AND ETHINYL ESTRADIOL 0.15-0.03
1 KIT ORAL DAILY
Qty: 84 TABLET | Refills: 3 | Status: SHIPPED | OUTPATIENT
Start: 2022-12-29

## 2022-12-29 RX ORDER — DESOGESTREL AND ETHINYL ESTRADIOL 0.15-0.03
KIT ORAL
Qty: 84 TABLET | Refills: 0 | OUTPATIENT
Start: 2022-12-29

## 2022-12-29 NOTE — TELEPHONE ENCOUNTER
Patient notified that OCP was refilled 12/10/22 by Dr. Azar Obrien office. She would need to reach out to that provider if there is an issue with the refill. Patient verbalized understanding.

## 2022-12-29 NOTE — TELEPHONE ENCOUNTER
Patient calling to inform that pharmacy did not receive prescription for Desogestrel-Ethinyl Estradiol (ISIBLOOM) 0.15-30 MG-MCG Oral Tab     Please re-send years supply to:    [address] [place], [place]

## 2023-06-06 ENCOUNTER — OFFICE VISIT (OUTPATIENT)
Dept: INTERNAL MEDICINE CLINIC | Facility: CLINIC | Age: 38
End: 2023-06-06

## 2023-06-06 ENCOUNTER — TELEPHONE (OUTPATIENT)
Dept: INTERNAL MEDICINE CLINIC | Facility: CLINIC | Age: 38
End: 2023-06-06

## 2023-06-06 VITALS
WEIGHT: 174 LBS | HEART RATE: 85 BPM | OXYGEN SATURATION: 99 % | HEIGHT: 66 IN | BODY MASS INDEX: 27.97 KG/M2 | DIASTOLIC BLOOD PRESSURE: 71 MMHG | SYSTOLIC BLOOD PRESSURE: 119 MMHG

## 2023-06-06 DIAGNOSIS — J45.31 MILD PERSISTENT ASTHMA WITH ACUTE EXACERBATION: Primary | ICD-10-CM

## 2023-06-06 DIAGNOSIS — J30.2 SEASONAL ALLERGIES: ICD-10-CM

## 2023-06-06 PROCEDURE — 3074F SYST BP LT 130 MM HG: CPT | Performed by: NURSE PRACTITIONER

## 2023-06-06 PROCEDURE — 3008F BODY MASS INDEX DOCD: CPT | Performed by: NURSE PRACTITIONER

## 2023-06-06 PROCEDURE — 3078F DIAST BP <80 MM HG: CPT | Performed by: NURSE PRACTITIONER

## 2023-06-06 PROCEDURE — 99214 OFFICE O/P EST MOD 30 MIN: CPT | Performed by: NURSE PRACTITIONER

## 2023-06-06 RX ORDER — PREDNISONE 20 MG/1
TABLET ORAL
Qty: 10 TABLET | Refills: 0 | Status: SHIPPED | OUTPATIENT
Start: 2023-06-06

## 2023-06-06 RX ORDER — FLUTICASONE PROPIONATE AND SALMETEROL 100; 50 UG/1; UG/1
1 POWDER RESPIRATORY (INHALATION) 2 TIMES DAILY
Qty: 1 EACH | Refills: 0 | Status: SHIPPED | OUTPATIENT
Start: 2023-06-06

## 2023-06-06 RX ORDER — ALBUTEROL SULFATE 90 UG/1
2 AEROSOL, METERED RESPIRATORY (INHALATION) EVERY 4 HOURS PRN
Qty: 3 EACH | Refills: 1 | Status: SHIPPED | OUTPATIENT
Start: 2023-06-06

## 2023-06-06 NOTE — TELEPHONE ENCOUNTER
Prior authorization for fluticasone-salmeterol was done through [facility].  It can take 1-5 business days for a decision to come back

## 2023-06-14 NOTE — TELEPHONE ENCOUNTER
Current Outpatient Medications   Medication Sig Dispense Refill   • azithromycin (ZITHROMAX Z-LACHO) 250 MG Oral Tab Take two tablets by mouth today, then one tablet daily. 6 tablet 0       Medication is on backorder. Please switch to another alternative. Detail Level: Generalized Quality 137: Melanoma: Continuity Of Care - Recall System: Patient information entered into a recall system that includes: target date for the next exam specified AND a process to follow up with patients regarding missed or unscheduled appointments Detail Level: Detailed Detail Level: Zone Detail Level: Simple

## 2023-06-15 ENCOUNTER — HOSPITAL ENCOUNTER (OUTPATIENT)
Dept: RESPIRATORY THERAPY | Facility: HOSPITAL | Age: 38
Discharge: HOME | End: 2023-06-15
Attending: NURSE PRACTITIONER
Payer: MEDICAID

## 2023-06-15 ENCOUNTER — OFFICE VISIT (OUTPATIENT)
Dept: DERMATOLOGY CLINIC | Facility: CLINIC | Age: 38
End: 2023-06-15

## 2023-06-15 ENCOUNTER — LAB ENCOUNTER (OUTPATIENT)
Dept: LAB | Age: 38
End: 2023-06-15
Attending: NURSE PRACTITIONER
Payer: MEDICAID

## 2023-06-15 DIAGNOSIS — L65.9 ALOPECIA: Primary | ICD-10-CM

## 2023-06-15 DIAGNOSIS — L65.9 ALOPECIA: ICD-10-CM

## 2023-06-15 DIAGNOSIS — J45.31 MILD PERSISTENT ASTHMA WITH ACUTE EXACERBATION: ICD-10-CM

## 2023-06-15 LAB
CRP SERPL-MCNC: <0.29 MG/DL (ref ?–0.3)
DEPRECATED HBV CORE AB SER IA-ACNC: 23.6 NG/ML
ERYTHROCYTE [SEDIMENTATION RATE] IN BLOOD: 10 MM/HR
VIT D+METAB SERPL-MCNC: 9.8 NG/ML (ref 30–100)

## 2023-06-15 PROCEDURE — 86038 ANTINUCLEAR ANTIBODIES: CPT

## 2023-06-15 PROCEDURE — 82728 ASSAY OF FERRITIN: CPT

## 2023-06-15 PROCEDURE — 84410 TESTOSTERONE BIOAVAILABLE: CPT

## 2023-06-15 PROCEDURE — 94726 PLETHYSMOGRAPHY LUNG VOLUMES: CPT | Performed by: INTERNAL MEDICINE

## 2023-06-15 PROCEDURE — 36415 COLL VENOUS BLD VENIPUNCTURE: CPT

## 2023-06-15 PROCEDURE — 86225 DNA ANTIBODY NATIVE: CPT

## 2023-06-15 PROCEDURE — 86140 C-REACTIVE PROTEIN: CPT

## 2023-06-15 PROCEDURE — 94060 EVALUATION OF WHEEZING: CPT | Performed by: INTERNAL MEDICINE

## 2023-06-15 PROCEDURE — 82306 VITAMIN D 25 HYDROXY: CPT

## 2023-06-15 PROCEDURE — 94729 DIFFUSING CAPACITY: CPT | Performed by: INTERNAL MEDICINE

## 2023-06-15 PROCEDURE — 85652 RBC SED RATE AUTOMATED: CPT

## 2023-06-15 PROCEDURE — 99213 OFFICE O/P EST LOW 20 MIN: CPT | Performed by: PHYSICIAN ASSISTANT

## 2023-06-15 RX ORDER — CLOBETASOL PROPIONATE 0.46 MG/ML
1 SOLUTION TOPICAL
Qty: 60 ML | Refills: 3 | Status: SHIPPED | OUTPATIENT
Start: 2023-06-15

## 2023-06-15 NOTE — PROCEDURES
[facility]     Pulmonary Function Test     [facility] Patient Status:  Outpatient    7/10/1985 MRN [id number]   Date of Exam 6/15/23 PCP Shelia Ricci MD           Spirometry   FEV1: 1.96 60%  FVC: 3.38 85%  FEV1/FVC: 0.58    Lung Volume   T.21 97%  RV : 1.81 108%    Diffusion Capacity   DLCO: 27.4 111%    Flow Volume Loop       Impression   Moderate obstructive defect seen with significant postbronchodilator response observed. Normal lung volumes. Normal diffusion capacity.     Cara Moreno DO  Pulmonary [address] [place] [address]

## 2023-06-16 ENCOUNTER — PATIENT MESSAGE (OUTPATIENT)
Dept: INTERNAL MEDICINE CLINIC | Facility: CLINIC | Age: 38
End: 2023-06-16

## 2023-06-20 LAB
DSDNA IGG SERPL IA-ACNC: 59.2 IU/ML
ENA AB SER QL IA: 0.4 UG/L
ENA AB SER QL IA: POSITIVE

## 2023-06-27 LAB
SEX HORM BIND GLOB: 265 NMOL/L
TESTOST % FREE+WEAK BND: 2.4 %
TESTOST FREE+WEAK BND: 0.6 NG/DL
TESTOSTERONE TOT /MS: 24.6 NG/DL

## 2023-06-28 ENCOUNTER — TELEPHONE (OUTPATIENT)
Dept: INTERNAL MEDICINE CLINIC | Facility: CLINIC | Age: 38
End: 2023-06-28

## 2023-08-01 ENCOUNTER — TELEPHONE (OUTPATIENT)
Dept: INTERNAL MEDICINE CLINIC | Facility: CLINIC | Age: 38
End: 2023-08-01

## 2023-08-01 NOTE — TELEPHONE ENCOUNTER
Called Pt to notify order was placed no answer , left VM informing order was placed and can view via [employer] .

## 2023-09-12 ENCOUNTER — OFFICE VISIT (OUTPATIENT)
Dept: DERMATOLOGY CLINIC | Facility: CLINIC | Age: 38
End: 2023-09-12

## 2023-09-12 ENCOUNTER — LAB ENCOUNTER (OUTPATIENT)
Dept: LAB | Age: 38
End: 2023-09-12
Attending: INTERNAL MEDICINE
Payer: MEDICAID

## 2023-09-12 ENCOUNTER — OFFICE VISIT (OUTPATIENT)
Dept: INTERNAL MEDICINE CLINIC | Facility: CLINIC | Age: 38
End: 2023-09-12

## 2023-09-12 VITALS
DIASTOLIC BLOOD PRESSURE: 67 MMHG | HEART RATE: 86 BPM | BODY MASS INDEX: 27.8 KG/M2 | WEIGHT: 173 LBS | HEIGHT: 66 IN | SYSTOLIC BLOOD PRESSURE: 105 MMHG

## 2023-09-12 DIAGNOSIS — L65.9 ALOPECIA: Primary | ICD-10-CM

## 2023-09-12 DIAGNOSIS — L65.9 ALOPECIA: ICD-10-CM

## 2023-09-12 DIAGNOSIS — E55.9 VITAMIN D DEFICIENCY: ICD-10-CM

## 2023-09-12 DIAGNOSIS — M35.9 CONNECTIVE TISSUE DISEASE (HCC): ICD-10-CM

## 2023-09-12 DIAGNOSIS — J30.2 SEASONAL ALLERGIES: ICD-10-CM

## 2023-09-12 DIAGNOSIS — J45.30 MILD PERSISTENT ASTHMA WITHOUT COMPLICATION: Primary | ICD-10-CM

## 2023-09-12 LAB
ALBUMIN SERPL-MCNC: 3.9 G/DL (ref 3.4–5)
ALBUMIN/GLOB SERPL: 1 {RATIO} (ref 1–2)
ALP LIVER SERPL-CCNC: 45 U/L
ALT SERPL-CCNC: 29 U/L
ANION GAP SERPL CALC-SCNC: 7 MMOL/L (ref 0–18)
AST SERPL-CCNC: 17 U/L (ref 15–37)
BASOPHILS # BLD AUTO: 0.05 X10(3) UL (ref 0–0.2)
BASOPHILS NFR BLD AUTO: 0.7 %
BILIRUB SERPL-MCNC: 0.4 MG/DL (ref 0.1–2)
BILIRUB UR QL: NEGATIVE
BUN BLD-MCNC: 12 MG/DL (ref 7–18)
BUN/CREAT SERPL: 14.5 (ref 10–20)
CALCIUM BLD-MCNC: 9.3 MG/DL (ref 8.5–10.1)
CHLORIDE SERPL-SCNC: 103 MMOL/L (ref 98–112)
CLARITY UR: CLEAR
CO2 SERPL-SCNC: 29 MMOL/L (ref 21–32)
COLOR UR: COLORLESS
CREAT BLD-MCNC: 0.83 MG/DL
DEPRECATED RDW RBC AUTO: 39.6 FL (ref 35.1–46.3)
EGFRCR SERPLBLD CKD-EPI 2021: 91 ML/MIN/1.73M2 (ref 60–?)
EOSINOPHIL # BLD AUTO: 0.1 X10(3) UL (ref 0–0.7)
EOSINOPHIL NFR BLD AUTO: 1.5 %
ERYTHROCYTE [DISTWIDTH] IN BLOOD BY AUTOMATED COUNT: 12.1 % (ref 11–15)
FASTING STATUS PATIENT QL REPORTED: NO
GLOBULIN PLAS-MCNC: 4 G/DL (ref 2.8–4.4)
GLUCOSE BLD-MCNC: 76 MG/DL (ref 70–99)
GLUCOSE UR-MCNC: NORMAL MG/DL
HCT VFR BLD AUTO: 42.2 %
HGB BLD-MCNC: 13.7 G/DL
HGB UR QL STRIP.AUTO: NEGATIVE
IMM GRANULOCYTES # BLD AUTO: 0.01 X10(3) UL (ref 0–1)
IMM GRANULOCYTES NFR BLD: 0.1 %
KETONES UR-MCNC: NEGATIVE MG/DL
LEUKOCYTE ESTERASE UR QL STRIP.AUTO: NEGATIVE
LYMPHOCYTES # BLD AUTO: 1.8 X10(3) UL (ref 1–4)
LYMPHOCYTES NFR BLD AUTO: 26.4 %
MCH RBC QN AUTO: 29 PG (ref 26–34)
MCHC RBC AUTO-ENTMCNC: 32.5 G/DL (ref 31–37)
MCV RBC AUTO: 89.4 FL
MONOCYTES # BLD AUTO: 0.6 X10(3) UL (ref 0.1–1)
MONOCYTES NFR BLD AUTO: 8.8 %
NEUTROPHILS # BLD AUTO: 4.26 X10 (3) UL (ref 1.5–7.7)
NEUTROPHILS # BLD AUTO: 4.26 X10(3) UL (ref 1.5–7.7)
NEUTROPHILS NFR BLD AUTO: 62.5 %
NITRITE UR QL STRIP.AUTO: NEGATIVE
OSMOLALITY SERPL CALC.SUM OF ELEC: 287 MOSM/KG (ref 275–295)
PH UR: 7.5 [PH] (ref 5–8)
PLATELET # BLD AUTO: 332 10(3)UL (ref 150–450)
POTASSIUM SERPL-SCNC: 3.8 MMOL/L (ref 3.5–5.1)
PROT SERPL-MCNC: 7.9 G/DL (ref 6.4–8.2)
PROT UR-MCNC: NEGATIVE MG/DL
RBC # BLD AUTO: 4.72 X10(6)UL
SODIUM SERPL-SCNC: 139 MMOL/L (ref 136–145)
SP GR UR STRIP: 1.01 (ref 1–1.03)
T4 FREE SERPL-MCNC: 1 NG/DL (ref 0.8–1.7)
TSI SER-ACNC: 0.57 MIU/ML (ref 0.36–3.74)
UROBILINOGEN UR STRIP-ACNC: NORMAL
VIT D+METAB SERPL-MCNC: 71.8 NG/ML (ref 30–100)
WBC # BLD AUTO: 6.8 X10(3) UL (ref 4–11)

## 2023-09-12 PROCEDURE — 99213 OFFICE O/P EST LOW 20 MIN: CPT | Performed by: PHYSICIAN ASSISTANT

## 2023-09-12 PROCEDURE — 86235 NUCLEAR ANTIGEN ANTIBODY: CPT

## 2023-09-12 PROCEDURE — 81003 URINALYSIS AUTO W/O SCOPE: CPT

## 2023-09-12 PROCEDURE — 85025 COMPLETE CBC W/AUTO DIFF WBC: CPT

## 2023-09-12 PROCEDURE — 82306 VITAMIN D 25 HYDROXY: CPT

## 2023-09-12 PROCEDURE — 84439 ASSAY OF FREE THYROXINE: CPT

## 2023-09-12 PROCEDURE — 80053 COMPREHEN METABOLIC PANEL: CPT

## 2023-09-12 PROCEDURE — 3074F SYST BP LT 130 MM HG: CPT | Performed by: INTERNAL MEDICINE

## 2023-09-12 PROCEDURE — 84443 ASSAY THYROID STIM HORMONE: CPT

## 2023-09-12 PROCEDURE — 3078F DIAST BP <80 MM HG: CPT | Performed by: INTERNAL MEDICINE

## 2023-09-12 PROCEDURE — 36415 COLL VENOUS BLD VENIPUNCTURE: CPT

## 2023-09-12 PROCEDURE — 3008F BODY MASS INDEX DOCD: CPT | Performed by: INTERNAL MEDICINE

## 2023-09-12 PROCEDURE — 99214 OFFICE O/P EST MOD 30 MIN: CPT | Performed by: INTERNAL MEDICINE

## 2023-09-12 RX ORDER — ALBUTEROL SULFATE 90 UG/1
2 AEROSOL, METERED RESPIRATORY (INHALATION) EVERY 4 HOURS PRN
Qty: 3 EACH | Refills: 1 | Status: SHIPPED | OUTPATIENT
Start: 2023-09-12

## 2023-09-13 LAB
ENA SS-A IGG SER IA-ACNC: <0.4 U/ML
ENA SS-B IGG SER IA-ACNC: <0.4 U/ML

## 2023-09-18 RX ORDER — ERGOCALCIFEROL 1.25 MG/1
50000 CAPSULE ORAL WEEKLY
Qty: 12 CAPSULE | Refills: 0 | OUTPATIENT
Start: 2023-09-18

## 2023-10-09 ENCOUNTER — HOSPITAL ENCOUNTER (OUTPATIENT)
Dept: MAMMOGRAPHY | Age: 38
Discharge: HOME | End: 2023-10-09
Attending: INTERNAL MEDICINE
Payer: MEDICAID

## 2023-10-09 DIAGNOSIS — Z12.31 VISIT FOR SCREENING MAMMOGRAM: ICD-10-CM

## 2023-10-09 PROCEDURE — 77063 BREAST TOMOSYNTHESIS BI: CPT | Performed by: INTERNAL MEDICINE

## 2023-10-09 PROCEDURE — 77067 SCR MAMMO BI INCL CAD: CPT | Performed by: INTERNAL MEDICINE

## 2023-10-10 ENCOUNTER — PATIENT MESSAGE (OUTPATIENT)
Dept: INTERNAL MEDICINE CLINIC | Facility: CLINIC | Age: 38
End: 2023-10-10

## 2023-10-24 ENCOUNTER — HOSPITAL ENCOUNTER (OUTPATIENT)
Dept: GENERAL RADIOLOGY | Facility: HOSPITAL | Age: 38
Discharge: HOME | End: 2023-10-24
Attending: INTERNAL MEDICINE

## 2023-10-24 ENCOUNTER — OFFICE VISIT (OUTPATIENT)
Dept: INTERNAL MEDICINE CLINIC | Facility: CLINIC | Age: 38
End: 2023-10-24
Payer: MEDICAID

## 2023-10-24 VITALS
SYSTOLIC BLOOD PRESSURE: 113 MMHG | WEIGHT: 167 LBS | DIASTOLIC BLOOD PRESSURE: 78 MMHG | BODY MASS INDEX: 26.84 KG/M2 | OXYGEN SATURATION: 100 % | HEART RATE: 96 BPM | HEIGHT: 66 IN

## 2023-10-24 DIAGNOSIS — J45.41 MODERATE PERSISTENT ASTHMA WITH ACUTE EXACERBATION: Primary | ICD-10-CM

## 2023-10-24 DIAGNOSIS — J45.41 MODERATE PERSISTENT ASTHMA WITH ACUTE EXACERBATION: ICD-10-CM

## 2023-10-24 PROCEDURE — 99214 OFFICE O/P EST MOD 30 MIN: CPT | Performed by: INTERNAL MEDICINE

## 2023-10-24 PROCEDURE — 3074F SYST BP LT 130 MM HG: CPT | Performed by: INTERNAL MEDICINE

## 2023-10-24 PROCEDURE — 3008F BODY MASS INDEX DOCD: CPT | Performed by: INTERNAL MEDICINE

## 2023-10-24 PROCEDURE — 71046 X-RAY EXAM CHEST 2 VIEWS: CPT | Performed by: INTERNAL MEDICINE

## 2023-10-24 PROCEDURE — 3078F DIAST BP <80 MM HG: CPT | Performed by: INTERNAL MEDICINE

## 2023-10-24 RX ORDER — FLUTICASONE PROPIONATE AND SALMETEROL 250; 50 UG/1; UG/1
1 POWDER RESPIRATORY (INHALATION) EVERY 12 HOURS SCHEDULED
Qty: 1 EACH | Refills: 2 | Status: SHIPPED | OUTPATIENT
Start: 2023-10-24

## 2023-10-24 RX ORDER — METHYLPREDNISOLONE 4 MG/1
TABLET ORAL
Qty: 1 EACH | Refills: 0 | Status: SHIPPED | OUTPATIENT
Start: 2023-10-24

## 2023-10-24 RX ORDER — AZITHROMYCIN 250 MG/1
TABLET, FILM COATED ORAL
Qty: 6 TABLET | Refills: 0 | Status: SHIPPED | OUTPATIENT
Start: 2023-10-24 | End: 2023-10-28

## 2023-11-07 ENCOUNTER — APPOINTMENT (OUTPATIENT)
Dept: GENERAL RADIOLOGY | Facility: HOSPITAL | Age: 38
End: 2023-11-07
Payer: MEDICAID

## 2023-11-07 ENCOUNTER — HOSPITAL ENCOUNTER (EMERGENCY)
Facility: HOSPITAL | Age: 38
Discharge: LEFT BEFORE DISCHARGE | End: 2023-11-07
Payer: MEDICAID

## 2023-11-07 ENCOUNTER — OFFICE VISIT (OUTPATIENT)
Dept: INTERNAL MEDICINE CLINIC | Facility: CLINIC | Age: 38
End: 2023-11-07
Payer: MEDICAID

## 2023-11-07 VITALS
HEIGHT: 65 IN | HEART RATE: 93 BPM | TEMPERATURE: 98 F | RESPIRATION RATE: 18 BRPM | SYSTOLIC BLOOD PRESSURE: 146 MMHG | BODY MASS INDEX: 26.99 KG/M2 | DIASTOLIC BLOOD PRESSURE: 96 MMHG | WEIGHT: 162 LBS | OXYGEN SATURATION: 99 %

## 2023-11-07 VITALS
TEMPERATURE: 98 F | OXYGEN SATURATION: 100 % | DIASTOLIC BLOOD PRESSURE: 80 MMHG | WEIGHT: 168 LBS | BODY MASS INDEX: 27 KG/M2 | SYSTOLIC BLOOD PRESSURE: 118 MMHG | HEART RATE: 92 BPM | HEIGHT: 66 IN

## 2023-11-07 DIAGNOSIS — J01.00 ACUTE NON-RECURRENT MAXILLARY SINUSITIS: Primary | ICD-10-CM

## 2023-11-07 PROCEDURE — 99213 OFFICE O/P EST LOW 20 MIN: CPT | Performed by: NURSE PRACTITIONER

## 2023-11-07 PROCEDURE — 3008F BODY MASS INDEX DOCD: CPT | Performed by: NURSE PRACTITIONER

## 2023-11-07 PROCEDURE — 3079F DIAST BP 80-89 MM HG: CPT | Performed by: NURSE PRACTITIONER

## 2023-11-07 PROCEDURE — 71045 X-RAY EXAM CHEST 1 VIEW: CPT

## 2023-11-07 PROCEDURE — 3074F SYST BP LT 130 MM HG: CPT | Performed by: NURSE PRACTITIONER

## 2023-11-07 RX ORDER — BENZONATATE 100 MG/1
100 CAPSULE ORAL 3 TIMES DAILY PRN
Qty: 20 CAPSULE | Refills: 0 | Status: SHIPPED | OUTPATIENT
Start: 2023-11-07 | End: 2023-11-14

## 2023-11-07 RX ORDER — AMOXICILLIN AND CLAVULANATE POTASSIUM 875; 125 MG/1; MG/1
1 TABLET, FILM COATED ORAL 2 TIMES DAILY
Qty: 20 TABLET | Refills: 0 | Status: SHIPPED | OUTPATIENT
Start: 2023-11-07 | End: 2023-11-17

## 2023-11-07 NOTE — ED INITIAL ASSESSMENT (HPI)
PT c/o cough for the last 2 weeks, fatigue, headache. PRevoiusly seen, given xray and abx, however has not had improvement in symptoms. Seen at urgent care today where xray recommended however no xray available at that location.

## 2023-11-08 NOTE — ASSESSMENT & PLAN NOTE
Patient has had upper respiratory symptoms for the last several days and not improving with over-the-counter medications. Will empirically treat with Augmentin twice daily for 10 days with meals. Increase fluid intake to stay well-hydrated. Plan  Hydrate with fluids  Tylenol two tabs every six hours. . Not to exceed 4 grams in one day. Drink Hot tea with lemon  Drink Hot tea with honey  Gargle with warm salt water if you have a sore throat. Benzonatate 100 mg po three times per day as needed for cough. Augmentin clavulanate 875mg /125mg po BID x 10 days       Contact the office if symptoms worsen or do not improve.

## 2023-11-14 ENCOUNTER — PATIENT MESSAGE (OUTPATIENT)
Dept: INTERNAL MEDICINE CLINIC | Facility: CLINIC | Age: 38
End: 2023-11-14

## 2023-11-15 ENCOUNTER — TELEPHONE (OUTPATIENT)
Dept: INTERNAL MEDICINE CLINIC | Facility: CLINIC | Age: 38
End: 2023-11-15

## 2023-11-15 RX ORDER — FLUCONAZOLE 150 MG/1
150 TABLET ORAL ONCE
Qty: 2 TABLET | Refills: 0 | Status: SHIPPED | OUTPATIENT
Start: 2023-11-15 | End: 2023-11-15

## 2023-11-15 NOTE — TELEPHONE ENCOUNTER
Spoke with pt,  verified, pt stated she's been on 3 rounds of abx,  Pt now c/o yeast infection for a week, she has white cottage cheese discharge, very irritated. Pt develop yeast infect from the abx she's been taking. Pt req 2 pills of diflucan. Pt was seen by Marcelo Duane on , saw Dr Tiffanie Rivera 10/24 and was rx'd zpack, was seen in ER . Pt been eating yogurt, ineffective. pls advise, thanks in advance. Rx pended.   See [employer] message 23                     Future Appointments   Date Time Provider Rufino Davis   2023  8:00 AM Edgar Merino MD  [facility] [address]   2023 10:00 AM [facility]   2024 10:20 AM Hoa Nash DO [facility]   2024 11:40 AM [facility] [address]

## 2023-11-20 RX ORDER — DESOGESTREL AND ETHINYL ESTRADIOL 0.15-0.03
1 KIT ORAL DAILY
Qty: 84 TABLET | Refills: 3 | Status: SHIPPED | OUTPATIENT
Start: 2023-11-20

## 2023-11-20 NOTE — TELEPHONE ENCOUNTER
Last PAP 2019, Appt scheduled for PAP 1/9/24  Please review. Protocol failed/ No protocol      Requested Prescriptions   Pending Prescriptions Disp Refills    ISIBLOOM 0.15-30 MG-MCG Oral Tab [Pharmacy Med Name: Luli Harding 0.15MG-0.03MG TABLETS 28S] 84 tablet 3     Sig: TAKE 1 TABLET BY MOUTH DAILY       Gynecology Medication Protocol Failed - 11/18/2023  3:24 AM        Failed - Pass dependent on manual look-up of last PAP and patient compliance with PAP follow up recommendations        Passed - In person appointment or virtual visit in the past 12 mos or appointment in next 3 mos     Recent Outpatient Visits              1 week ago Acute non-recurrent maxillary sinusitis    [facility], [address] [facility] Adama Amaya [place], APRN    Office Visit    3 weeks ago Moderate persistent asthma with acute exacerbation    Katy Pritchard MD    Office Visit    2 months ago Mild persistent asthma without complication    Katy Pritchard MD    Office Visit    2 months ago Mckinley Mcknight [address], [place]    Office Visit    5 months ago Mckinley Mcknight [address], [place], PA-C    Office Visit          Future Appointments         Provider Department Appt Notes    In 3 weeks Christina Mason MD [address], [address] [facility] recomended by dermatology, connictive tissue disease showing in blood work    In 1 month [address] [facility] [place]     [address], [address], [address], [address] [facility] [address], [address] gyne  last px 10/04/21    In 1 month Scooby Griffith MD [address], [address] [facility] Alcon Amaya Annual physical last px 11/22/2022                    Future Appointments         Provider Department Appt Notes    In 3 weeks Ale Quiroz MD [facility], [address] recomended by dermatology, connictive tissue disease showing in blood work    In 1 month [address] [facility]     In 1 month Rashi Beck, [place], [address], [address] [facility] [address] [facility] [address] Annual gyne  last px 10/04/21    In 1 month [facility], [address] [facility] Annual physical last px 11/22/2022             Recent Outpatient Visits              1 week ago Acute non-recurrent maxillary sinusitis    [address] [facility] KRISTINE Brooke    Office Visit    3 weeks ago Moderate persistent asthma with acute exacerbation    Jacinto Haro MD    Office Visit    2 months ago Mild persistent asthma without complication    Jacinto Haro MD    Office Visit    2 months ago [address] [facility], [facility] [place], [place], [employer]    Office Visit    5 months ago Dago Armando, [address] [facility], Candice Marins, [employer]    Office Visit

## 2023-12-15 NOTE — TELEPHONE ENCOUNTER
Lets have her return for followup. Please schedule her in a time when Agustina Sanchez is with me as well on Monday or Friday.

## 2023-12-15 NOTE — TELEPHONE ENCOUNTER
Refill Request for medication(s): clobetasol 0.05 % External Solution     Last Office Visit: 9/2023    Last Refill: 11/2023    Pharmacy, Dosage verified:     Condition Update (if applicable): msg sent    Rx pended and sent to provider for approval, please advise. Thank You!

## 2023-12-16 ENCOUNTER — OFFICE VISIT (OUTPATIENT)
Dept: RHEUMATOLOGY | Facility: CLINIC | Age: 38
End: 2023-12-16
Payer: MEDICAID

## 2023-12-16 ENCOUNTER — TELEPHONE (OUTPATIENT)
Dept: RHEUMATOLOGY | Facility: CLINIC | Age: 38
End: 2023-12-16

## 2023-12-16 ENCOUNTER — OFFICE VISIT (OUTPATIENT)
Dept: DERMATOLOGY CLINIC | Facility: CLINIC | Age: 38
End: 2023-12-16
Payer: MEDICAID

## 2023-12-16 ENCOUNTER — LAB ENCOUNTER (OUTPATIENT)
Dept: LAB | Age: 38
End: 2023-12-16
Attending: INTERNAL MEDICINE
Payer: MEDICAID

## 2023-12-16 VITALS
HEIGHT: 66 IN | WEIGHT: 166 LBS | BODY MASS INDEX: 26.68 KG/M2 | RESPIRATION RATE: 16 BRPM | SYSTOLIC BLOOD PRESSURE: 119 MMHG | DIASTOLIC BLOOD PRESSURE: 80 MMHG | HEART RATE: 81 BPM

## 2023-12-16 DIAGNOSIS — L65.9 ALOPECIA: Primary | ICD-10-CM

## 2023-12-16 DIAGNOSIS — I73.00 RAYNAUD'S DISEASE WITHOUT GANGRENE: ICD-10-CM

## 2023-12-16 DIAGNOSIS — L65.9 ALOPECIA: ICD-10-CM

## 2023-12-16 DIAGNOSIS — R76.8 DS DNA ANTIBODY POSITIVE: ICD-10-CM

## 2023-12-16 DIAGNOSIS — R76.8 POSITIVE ANA (ANTINUCLEAR ANTIBODY): ICD-10-CM

## 2023-12-16 DIAGNOSIS — M79.10 MYALGIA: ICD-10-CM

## 2023-12-16 DIAGNOSIS — R76.8 POSITIVE ANA (ANTINUCLEAR ANTIBODY): Primary | ICD-10-CM

## 2023-12-16 LAB
C3 SERPL-MCNC: 138.9 MG/DL (ref 90–170)
C4 SERPL-MCNC: 18.6 MG/DL (ref 12–36)
CK SERPL-CCNC: 116 U/L
CRP SERPL-MCNC: <0.4 MG/DL (ref ?–1)
ERYTHROCYTE [SEDIMENTATION RATE] IN BLOOD: 14 MM/HR
RHEUMATOID FACT SERPL-ACNC: <10 IU/ML (ref ?–14)

## 2023-12-16 PROCEDURE — 86039 ANTINUCLEAR ANTIBODIES (ANA): CPT

## 2023-12-16 PROCEDURE — 86038 ANTINUCLEAR ANTIBODIES: CPT

## 2023-12-16 PROCEDURE — 85610 PROTHROMBIN TIME: CPT

## 2023-12-16 PROCEDURE — 85613 RUSSELL VIPER VENOM DILUTED: CPT

## 2023-12-16 PROCEDURE — 86146 BETA-2 GLYCOPROTEIN ANTIBODY: CPT

## 2023-12-16 PROCEDURE — 82550 ASSAY OF CK (CPK): CPT

## 2023-12-16 PROCEDURE — 85390 FIBRINOLYSINS SCREEN I&R: CPT

## 2023-12-16 PROCEDURE — 86235 NUCLEAR ANTIGEN ANTIBODY: CPT

## 2023-12-16 PROCEDURE — 3008F BODY MASS INDEX DOCD: CPT | Performed by: INTERNAL MEDICINE

## 2023-12-16 PROCEDURE — 86147 CARDIOLIPIN ANTIBODY EA IG: CPT

## 2023-12-16 PROCEDURE — 99204 OFFICE O/P NEW MOD 45 MIN: CPT | Performed by: INTERNAL MEDICINE

## 2023-12-16 PROCEDURE — 85652 RBC SED RATE AUTOMATED: CPT

## 2023-12-16 PROCEDURE — 36415 COLL VENOUS BLD VENIPUNCTURE: CPT

## 2023-12-16 PROCEDURE — 86225 DNA ANTIBODY NATIVE: CPT

## 2023-12-16 PROCEDURE — 86160 COMPLEMENT ANTIGEN: CPT

## 2023-12-16 PROCEDURE — 85598 HEXAGNAL PHOSPH PLTLT NEUTRL: CPT

## 2023-12-16 PROCEDURE — 86431 RHEUMATOID FACTOR QUANT: CPT

## 2023-12-16 PROCEDURE — 85730 THROMBOPLASTIN TIME PARTIAL: CPT

## 2023-12-16 PROCEDURE — 82085 ASSAY OF ALDOLASE: CPT

## 2023-12-16 PROCEDURE — 3079F DIAST BP 80-89 MM HG: CPT | Performed by: INTERNAL MEDICINE

## 2023-12-16 PROCEDURE — 3074F SYST BP LT 130 MM HG: CPT | Performed by: INTERNAL MEDICINE

## 2023-12-16 PROCEDURE — 99213 OFFICE O/P EST LOW 20 MIN: CPT | Performed by: PHYSICIAN ASSISTANT

## 2023-12-16 PROCEDURE — 86140 C-REACTIVE PROTEIN: CPT

## 2023-12-16 RX ORDER — TACROLIMUS 1 MG/G
1 OINTMENT TOPICAL 2 TIMES DAILY
Qty: 60 G | Refills: 3 | Status: SHIPPED | OUTPATIENT
Start: 2023-12-16

## 2023-12-16 NOTE — TELEPHONE ENCOUNTER
There is a 1/20/2024 - 11:20 am appointment open - can we offer for follow up - she lives in [place] Virtual prechemo visit for C3 Taxol/Carbo at [facility] on 12/30/02021, to have labs drawn on 12/27/2021, pt reports some minor blood in her stools    1. Have you been to the ER, urgent care clinic since your last visit? Hospitalized since your last visit?  no    2. Have you seen or consulted any other health care providers outside of the [address] [place] [address] since your last visit? Include any pap smears or colon screening.    no

## 2023-12-16 NOTE — PROGRESS NOTES
HPI:    Patient ID: Tyson Miles is a 34year old female. Patient presents for follow-up visit on alopecia. She has been seen in the past and was started on clobetasol. ROSA MARIA testing positive and was told to see rheumatology which she did today. Has noted continued shedding. Has noted continued tenderness as well. Has used plaquinel in the past which did not help the regrowth of hair. Has has potential traction alopecia as well according to [facility] dermatology. She has noted some itching. Hx of allergies to medications noted. Not currently pregnant nor plans to be pregnant soon. Review of Systems   Constitutional:  Negative for chills and fever. Musculoskeletal:  Negative for arthralgias and myalgias. Skin:  Positive for rash. Negative for color change and wound. Current Outpatient Medications   Medication Sig Dispense Refill    tacrolimus (PROTOPIC) 0.1 % External Ointment Apply 1 Application topically 2 (two) times daily. 60 g 3    Desogestrel-Ethinyl Estradiol (ISIBLOOM) 0.15-30 MG-MCG Oral Tab Take 1 tablet by mouth daily. 84 tablet 3    fluticasone-salmeterol (ADVAIR DISKUS) 250-50 MCG/ACT Inhalation Aerosol Powder, Breath Activated Inhale 1 puff into the lungs every 12 (twelve) hours. 1 each 2    albuterol 108 (90 Base) MCG/ACT Inhalation Aero Soln Inhale 2 puffs into the lungs every 4 (four) hours as needed for Wheezing. 3 each 1    clobetasol 0.05 % External Solution Apply 1 mL topically daily as needed. 60 mL 3    albuterol (2.5 MG/3ML) 0.083% Inhalation Nebu Soln Take 3 mL (2.5 mg total) by nebulization every 4 (four) hours as needed for Wheezing. 100 each 3    Multiple Vitamin (MULTIVITAMIN ADULT OR) Take by mouth. Allergies: Allergies   Allergen Reactions    Egg RESPIRATORY FAILURE and ANAPHYLAXIS    Seafood UNKNOWN    Shellfish RESPIRATORY FAILURE      LMP 10/04/2023 (Approximate)   There is no height or weight on file to calculate BMI.   PHYSICAL EXAM:   Physical Exam  Constitutional:       General: She is not in acute distress. Appearance: Normal appearance. Skin:     General: Skin is warm and dry. Findings: Rash present. Comments: Traction alopecia possible in the frontal scalp. No draining or tenderness noted. Does have slight tenderness on top of scalp. no erythema noted. No sores noted today on exam. No scaling noted. Follicles are present, although some areas are smooth. Neurological:      Mental Status: She is alert and oriented to person, place, and time. ASSESSMENT/PLAN:   1. Alopecia  -After discussion with patient, advised the following:  -Continue with clobetasol   -Add tacrolimus  -Await labs from Rheumatology  -Will decide which systemic medication will work best.   -Return after starting to new medication   -Some hair loss will take time to regrow  -Can try rogaine, but will have to be consistent.   -To call or follow-up with worsening symptoms or concerns.   -Pt was agreeable to plan and will comply with discussion above. - tacrolimus (PROTOPIC) 0.1 % External Ointment; Apply 1 Application topically 2 (two) times daily. Dispense: 60 g; Refill: 3      No orders of the defined types were placed in this encounter. Meds This Visit:  Requested Prescriptions     Signed Prescriptions Disp Refills    tacrolimus (PROTOPIC) 0.1 % External Ointment 60 g 3     Sig: Apply 1 Application topically 2 (two) times daily.        Imaging & Referrals:  None         OL#[id number]

## 2023-12-16 NOTE — PATIENT INSTRUCTIONS
1 ,check labs today   2. Would recommend a new biopsy   3. [employer] could help. She is seeing dermatology today -   4.  Return to clinic in 1 month

## 2023-12-18 LAB
B2 GLYCOPROT1 IGG SERPL IA-ACNC: 1.3 U/ML (ref ?–7)
B2 GLYCOPROT1 IGM SERPL IA-ACNC: <2.4 U/ML (ref ?–7)
CARDIOLIPIN IGG SERPL-ACNC: 2.2 GPL (ref ?–10)
CARDIOLIPIN IGM SERPL-ACNC: 1.2 MPL (ref ?–10)
ENA RNP IGG SER IA-ACNC: 1 U/ML
ENA SCL70 IGG SER IA-ACNC: 0.7 U/ML
ENA SM IGG SER IA-ACNC: <0.7 U/ML
NUCLEAR IGG TITR SER IF: POSITIVE {TITER}
U1 SNRNP IGG SER IA-ACNC: 2 U/ML

## 2023-12-19 LAB
ALDOLASE: 2.7 U/L
ANA NUCLEOLAR TITR SER IF: 160 {TITER}
APTT PPP: 25.3 SECONDS (ref 23.3–35.6)
CONFIRM APTT STACLOT: NEGATIVE
CONFIRM DRVVT: 0.9 S (ref 0–1.1)
DSDNA AB TITR SER: <10 {TITER}
PROTHROMBIN TIME: 12 SECONDS (ref 11.6–14.8)

## 2023-12-26 ENCOUNTER — HOSPITAL ENCOUNTER (OUTPATIENT)
Dept: MAMMOGRAPHY | Facility: HOSPITAL | Age: 38
Discharge: HOME | End: 2023-12-26
Attending: INTERNAL MEDICINE
Payer: MEDICAID

## 2023-12-26 DIAGNOSIS — R92.2 INCONCLUSIVE MAMMOGRAM: ICD-10-CM

## 2023-12-26 PROCEDURE — 77061 BREAST TOMOSYNTHESIS UNI: CPT | Performed by: INTERNAL MEDICINE

## 2023-12-26 PROCEDURE — 77065 DX MAMMO INCL CAD UNI: CPT | Performed by: INTERNAL MEDICINE

## 2023-12-26 RX ORDER — CLOBETASOL PROPIONATE 0.46 MG/ML
1 SOLUTION TOPICAL DAILY PRN
Qty: 50 ML | Refills: 0 | OUTPATIENT
Start: 2023-12-26

## 2024-01-03 ENCOUNTER — OFFICE VISIT (OUTPATIENT)
Dept: OBGYN CLINIC | Facility: CLINIC | Age: 39
End: 2024-01-03
Payer: MEDICAID

## 2024-01-03 VITALS
HEIGHT: 65.5 IN | SYSTOLIC BLOOD PRESSURE: 133 MMHG | WEIGHT: 166 LBS | BODY MASS INDEX: 27.33 KG/M2 | HEART RATE: 99 BPM | DIASTOLIC BLOOD PRESSURE: 87 MMHG

## 2024-01-03 DIAGNOSIS — Z01.419 WELL WOMAN EXAM: Primary | ICD-10-CM

## 2024-01-03 DIAGNOSIS — Z76.0 MEDICATION REFILL: ICD-10-CM

## 2024-01-03 PROCEDURE — 3079F DIAST BP 80-89 MM HG: CPT | Performed by: OBSTETRICS & GYNECOLOGY

## 2024-01-03 PROCEDURE — 3075F SYST BP GE 130 - 139MM HG: CPT | Performed by: OBSTETRICS & GYNECOLOGY

## 2024-01-03 PROCEDURE — 3008F BODY MASS INDEX DOCD: CPT | Performed by: OBSTETRICS & GYNECOLOGY

## 2024-01-03 PROCEDURE — 99396 PREV VISIT EST AGE 40-64: CPT | Performed by: OBSTETRICS & GYNECOLOGY

## 2024-01-03 RX ORDER — DESOGESTREL AND ETHINYL ESTRADIOL 0.15-0.03
1 KIT ORAL DAILY
Qty: 84 TABLET | Refills: 3 | Status: SHIPPED | OUTPATIENT
Start: 2024-01-03

## 2024-01-03 NOTE — PROGRESS NOTES
HPI:  The patient is a 39 yo F here for WWE.  Cycles monthly with 4-5d flow on OCs.  /monogamous.  NO GYN c/o     Patient's last menstrual period was 2023 (approximate).      Latest Ref Rng & Units 12/10/2022    11:12 AM 2019    12:08 PM   RECENT PAP RESULTS   Thinprep Pap Negative for intraepithelial lesion or malignancy Negative for intraepithelial lesion or malignancy  Negative for intraepithelial lesion or malignancy    HPV Negative Negative  Negative         Hx Prior Abnormal Pap: No  Pap Date: 12/10/22  Pap Result Notes: pap/hpv negative  Follow Up Recommendation: 3years        Depression Screening (PHQ-2/PHQ-9): Over the LAST 2 WEEKS   Little interest or pleasure in doing things (over the last two weeks)?: Not at all  Little interest or pleasure in doing things: Not at all    Feeling down, depressed, or hopeless (over the last two weeks)?: Not at all  Feeling down, depressed, or hopeless: Not at all    PHQ-2 SCORE: 0  PHQ-2 SCORE: 0          Reviewed medical and surgical history below       OBSTETRICS HISTORY:  OB History    Para Term  AB Living   3 3 3 0 0 3   SAB IAB Ectopic Multiple Live Births   0 0 0 0 2       GYNE HISTORY:  History   Sexual Activity    Sexual activity: Yes    Partners: Male    Birth control/ protection: OCP            MEDICAL HISTORY:  Past Medical History:   Diagnosis Date    AMA 19  MFM EFW = 57% 20  [place] low risk male.   2020; Options of FTS, CVS, amnio, genetic counseling, Level 1 vs level 2 u/s reviewed -- wishes for level 2 u/s. Undecided re: FTS If 35-38 y/o by EDC, then [  ] [facility] u/s at 32 wks [  ] weekly NST at 34      Asthma     Decorative tattoo     Eczema     Lichen planopilaris     Nasal mass 2014    benign, excision of mass    Nasal polyposis 7/15/2016    Nasal polyposis 7/15/2016    Positive GBS test 2021    Labor prophylaxis    Varicose veins of legs        SURGICAL HISTORY:  Past Surgical History:    Procedure Laterality Date          OTHER SURGICAL HISTORY      excision nasal mass, benign       SOCIAL HISTORY:  Social History     Socioeconomic History    Marital status:      Spouse name: Not on file    Number of children: Not on file    Years of education: Not on file    Highest education level: Not on file   Occupational History    Not on file   Tobacco Use    Smoking status: Never     Passive exposure: Never    Smokeless tobacco: Never   Vaping Use    Vaping Use: Never used   Substance and Sexual Activity    Alcohol use: Not Currently     Alcohol/week: 0.0 standard drinks of alcohol    Drug use: Never    Sexual activity: Yes     Partners: Male     Birth control/protection: OCP   Other Topics Concern     Service Not Asked    Blood Transfusions Not Asked    Caffeine Concern No    Occupational Exposure Not Asked    Hobby Hazards Not Asked    Sleep Concern Not Asked    Stress Concern Not Asked    Weight Concern Not Asked    Special Diet Not Asked    Back Care Not Asked    Exercise Not Asked    Bike Helmet Not Asked    Seat Belt Not Asked    Self-Exams Not Asked    Grew up on a farm Not Asked    History of tanning Not Asked    Outdoor occupation Not Asked    Breast feeding No    Reaction to local anesthetic No    Pt has a pacemaker No    Pt has a defibrillator No   Social History Narrative    Not on file     Social Determinants of Health     Financial Resource Strain: Not on file   Food Insecurity: Not on file   Transportation Needs: Not on file   Physical Activity: Not on file   Stress: Not on file   Social Connections: Not on file   Housing Stability: Not on file       FAMILY HISTORY:  Family History   Problem Relation Age of Onset    Diabetes Other     Other (Other[other]) Mother         allergies, asthma, eczema    Diabetes Mother     Hypertension Brother     Other (Other[other]) Brother         asthma, eczema       MEDICATIONS:    Current Outpatient Medications:     Desogestrel-Ethinyl  Estradiol (ISIBLOOM) 0.15-30 MG-MCG Oral Tab, Take 1 tablet by mouth daily., Disp: 84 tablet, Rfl: 3    tacrolimus (PROTOPIC) 0.1 % External Ointment, Apply 1 Application topically 2 (two) times daily., Disp: 60 g, Rfl: 3    fluticasone-salmeterol (ADVAIR DISKUS) 250-50 MCG/ACT Inhalation Aerosol Powder, Breath Activated, Inhale 1 puff into the lungs every 12 (twelve) hours., Disp: 1 each, Rfl: 2    Multiple Vitamin (MULTIVITAMIN ADULT OR), Take by mouth., Disp: , Rfl:     albuterol 108 (90 Base) MCG/ACT Inhalation Aero Soln, Inhale 2 puffs into the lungs every 4 (four) hours as needed for Wheezing. (Patient not taking: Reported on 1/3/2024), Disp: 3 each, Rfl: 1    ALLERGIES:    Allergies   Allergen Reactions    Egg RESPIRATORY FAILURE and ANAPHYLAXIS    Seafood UNKNOWN    Shellfish RESPIRATORY FAILURE         Review of Systems:  Constitutional:  Denies fevers and chills   Cardiovascular:  denies chest pain or palpitations  Respiratory:  denies shortness of breath  Gastrointestinal:  denies heartburn, abdominal pain, diarrhea or constipation  Genitourinary:  denies dysuria, incontinence, abnormal vaginal discharge, vaginal itching  Musculoskeletal:  denies back pain.  Skin/Breast:  Denies any breast pain, lumps, or discharge.   Neurological:  denies headaches, extremity weakness  Psychiatric: denies depression or anxiety.      Vitals:    01/03/24 0835   BP: 133/87   Pulse: 99       PHYSICAL EXAM:   Constitutional: well developed, well nourished  Head/Face: normocephalic  Neck/Thyroid: thyroid symmetric, no thyromegaly, no nodules, no adenopathy  Heart: Regular rate and rhythm   Lungs: clear to ascultation bilaterally   Lymphatic:no abnormal supraclavicular or axillary adenopathy is noted  Breast: normal without palpable masses, tenderness, asymmetry, nipple discharge, nipple retraction or skin changes  Abdomen:  soft, nontender, nondistended, no masses  Skin/Hair: no unusual rashes or bruises  Extremities: no  edema, no cyanosis  Psychiatric: Appropriate mood and affect    Pelvic Exam:  External Genitalia: normal appearance, hair distribution, and no lesions  Urethral Meatus:  normal in size, location, without lesions and prolapse  Bladder:  No fullness, masses or tenderness  Vagina:  Normal appearance without lesions, no abnormal discharge  Cervix:  Normal without tenderness on motion  Uterus: normal in size, contour, position, mobility, without tenderness  Adnexa: normal without masses or tenderness  Perineum: normal    Assessment/Plan:  Roula was seen today for annual.    Diagnoses and all orders for this visit:    Well woman exam    Medication refill    Other orders  -     Desogestrel-Ethinyl Estradiol (ISIBLOOM) 0.15-30 MG-MCG Oral Tab; Take 1 tablet by mouth daily.        WWE:   Reviewed ASCCP guidelines with the patient -- Pap UTD  Contraception: OCs refilled  Breast Health:     Mammo UTD  Encouraged monthly self breast exams with the patient   Discussed diet, exercise, MVIs with Vit D  Follow up in 1 yr for WWE

## 2024-01-20 ENCOUNTER — TELEPHONE (OUTPATIENT)
Dept: RHEUMATOLOGY | Facility: CLINIC | Age: 39
End: 2024-01-20

## 2024-01-20 NOTE — TELEPHONE ENCOUNTER
Patient calling to schedule follow up, next appointment available is April, patient requesting sooner appointment. Please advise.

## 2024-01-22 NOTE — TELEPHONE ENCOUNTER
FYI: Talked to patient told her message below understood and asking if any earlier Video appointment with the doctor due to she is more than hour away from the office.  Please advise, Thank you.    Please reply to pool: EM CC IM FM ALG RHE

## 2024-01-25 ENCOUNTER — OFFICE VISIT (OUTPATIENT)
Dept: RHEUMATOLOGY | Facility: CLINIC | Age: 39
End: 2024-01-25
Payer: MEDICAID

## 2024-01-25 VITALS — HEIGHT: 65.5 IN | BODY MASS INDEX: 27 KG/M2

## 2024-01-25 DIAGNOSIS — I73.00 RAYNAUD'S DISEASE WITHOUT GANGRENE: ICD-10-CM

## 2024-01-25 DIAGNOSIS — M79.641 PAIN IN BOTH HANDS: ICD-10-CM

## 2024-01-25 DIAGNOSIS — R76.8 POSITIVE ANA (ANTINUCLEAR ANTIBODY): Primary | ICD-10-CM

## 2024-01-25 DIAGNOSIS — L65.9 ALOPECIA: ICD-10-CM

## 2024-01-25 DIAGNOSIS — M79.642 PAIN IN BOTH HANDS: ICD-10-CM

## 2024-01-25 PROCEDURE — 99214 OFFICE O/P EST MOD 30 MIN: CPT | Performed by: INTERNAL MEDICINE

## 2024-01-25 NOTE — PATIENT INSTRUCTIONS
1 ,check labs if hand pain worsens  2. Would recommend a new biopsy   3. Follow up with derm again  4. info raynaud's

## 2024-01-25 NOTE — PROGRESS NOTES
Roula Marshall is a 38 year old female who presents for   Chief Complaint   Patient presents with    Lab Results    Hand Pain     B/L, very painful and sensitive and finger tips    .   HPI:     I had the pleasure of seeing Roula Marshall on 12/16/2023 for evaluation.     She is a pleasant 38 year old who has alopecia and recent ds dna in 9/2023. She was referred by Dermatology - RADHA Rosa for evaluation for lupus.   She has had scalp hair loss since 2009. She gets sores on her head.   She was given clobetsol solution and it's not helping. She is going to see dermatology again later today.   She had connective tissue disease panel sent in 6/2023.     No butterfly rash. Has dry eyes and dry mouth.   Her fingertips burn badly in the cold.   Sometimes they go whie and purple. She has these Raynaud's symptoms.     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 The past month she has had two parads and her hands have gotten painful this year - and her feet.   Has asthma. Now on advair b/c of her breathing. No pleurisy now but was having chest wall pains with her asthma exacerbation.   Was sick from October to novemeber. Had prednisone and steroid shot and finally got better.   No joint pain   No fatigue.     Her sister has SLE lupus.     She had tried plaquenil by dermatologist at [facility] several years ago - was on it for 3 years - from 2009 -2013 -   It wasn't working well - she had less hair loss than now.   5/2018 -at [facility] had biopsy  consistent with inflammatory alopecia, ddx includes LPP with eczematous dermatitis or early pattern alopecia   But wanted to get pregnant again -     1/25/2024  Her hands are more painful in any water   She feels that they get painful int he cold and they get painful.   She has extreem pain - this started this year.   No color changes that she noticed.       Wt Readings from Last 2 Encounters:   01/03/24 166 lb (75.3 kg)   12/16/23 166 lb (75.3 kg)     Body mass index is  27.2 kg/m².      Current Outpatient Medications   Medication Sig Dispense Refill    Desogestrel-Ethinyl Estradiol (ISIBLOOM) 0.15-30 MG-MCG Oral Tab Take 1 tablet by mouth daily. 84 tablet 3    tacrolimus (PROTOPIC) 0.1 % External Ointment Apply 1 Application topically 2 (two) times daily. 60 g 3    fluticasone-salmeterol (ADVAIR DISKUS) 250-50 MCG/ACT Inhalation Aerosol Powder, Breath Activated Inhale 1 puff into the lungs every 12 (twelve) hours. 1 each 2    Multiple Vitamin (MULTIVITAMIN ADULT OR) Take by mouth.      albuterol 108 (90 Base) MCG/ACT Inhalation Aero Soln Inhale 2 puffs into the lungs every 4 (four) hours as needed for Wheezing. (Patient not taking: Reported on 1/3/2024) 3 each 1      Past Medical History:   Diagnosis Date    AMA 19  MFM EFW = 57% 20  [place] low risk male.   2020; Options of FTS, CVS, amnio, genetic counseling, Level 1 vs level 2 u/s reviewed -- wishes for level 2 u/s. Undecided re: FTS If 35-38 y/o by EDC, then [  ] [facility] u/s at 32 wks [  ] weekly NST at 34      Asthma     Decorative tattoo     Eczema     Lichen planopilaris     Nasal mass     benign, excision of mass    Nasal polyposis 7/15/2016    Nasal polyposis 7/15/2016    Positive GBS test 2021    Labor prophylaxis    Varicose veins of legs       Past Surgical History:   Procedure Laterality Date          OTHER SURGICAL HISTORY      excision nasal mass, benign      Family History   Problem Relation Age of Onset    Diabetes Other     Other (Other[other]) Mother         allergies, asthma, eczema    Diabetes Mother     Hypertension Brother     Other (Other[other]) Brother         asthma, eczema      Social History: 2 brothers and 2 sisters   One older sister has SLE  Social History     Socioeconomic History    Marital status:    Tobacco Use    Smoking status: Never     Passive exposure: Never    Smokeless tobacco: Never   Vaping Use    Vaping Use: Never used   Substance and Sexual  Activity    Alcohol use: Not Currently     Alcohol/week: 0.0 standard drinks of alcohol    Drug use: Never    Sexual activity: Yes     Partners: Male     Birth control/protection: OCP   Other Topics Concern    Caffeine Concern No    Breast feeding No    Reaction to local anesthetic No    Pt has a pacemaker No    Pt has a defibrillator No       , [employer] instructor at [employer] , 3 children     REVIEW OF SYSTEMS:   Review Of Systems:  Fatigue  Constitutional:No fever, no change in weight or appetitie  Derm: No rashes, no oral ulcers,  alopecia, no photosensitivity, no psoriasis  HEENT: No dry eyes, no dry mouth, Raynaud's, no nasal ulcers, no parotid swelling, no neck pain, no jaw pain, no temple pain  Eyes: No visual changes,   CVS: No chest pain, no heart disease  RS:  hx of asthma, No SOB, no Cough, No Pleurtic pain,   GI: No nausea, no vomiiting, no abominal pain, no hx of ulcer, no gastritis, no heartburn, no dyshpagia, no BRBPR or melena  : no dysuria, no hx of miscarriages, no DVT Hx, now back on  OCP,   3 children  - normal devlieries,   Neuro: No numbness or tingling, no headache, no hx of seizures,   Psych: no hx of anxiety or depression  ENDO: no hx of thyroid disease, no hx of DM  Joint/Muscluskeltal: see HPI,   All other ROS are negative.     EXAM:    5' 5.5\" (1.664 m)   LMP 12/28/2023 (Approximate)   BMI 27.20 kg/m²   HEENT: Clear oropharynx, no oral ulcers, EOM intact, clear sclear, PERRLA, pleasant, no acute distress, no CAD,   Alopecia noted   No rashes  CVS: RRR, no murmurs  RS: CTAB, no crackles, no rhonchi  ABD: Soft Non tender, no HSM felt, BS positive  Joint exam:   no neck tendnerness, good ROM,   No joint tenoderness   Good cap refill   Hands cool to touch  EXTREMITIES: no cyanosis, clubbing or edema  NEURO: intact touch, 5/5 ue and le strength    Component      Latest Ref Rng 9/12/2023   WBC      4.0 - 11.0 x10(3) uL 6.8    RBC      3.80 - 5.30 x10(6)uL 4.72    Hemoglobin      12.0 -  16.0 g/dL 13.7    Hematocrit      35.0 - 48.0 % 42.2    MCV      80.0 - 100.0 fL 89.4    MCH      26.0 - 34.0 pg 29.0    MCHC      31.0 - 37.0 g/dL 32.5    RDW-SD      35.1 - 46.3 fL 39.6    RDW      11.0 - 15.0 % 12.1    Platelet Count      150.0 - 450.0 10(3)uL 332.0    Prelim Neutrophil Abs      1.50 - 7.70 x10 (3) uL 4.26    Neutrophils Absolute      1.50 - 7.70 x10(3) uL 4.26    Lymphocytes Absolute      1.00 - 4.00 x10(3) uL 1.80    Monocytes Absolute      0.10 - 1.00 x10(3) uL 0.60    Eosinophils Absolute      0.00 - 0.70 x10(3) uL 0.10    Basophils Absolute      0.00 - 0.20 x10(3) uL 0.05    Immature Granulocyte Absolute      0.00 - 1.00 x10(3) uL 0.01    Neutrophils %      % 62.5    Lymphocytes %      % 26.4    Monocytes %      % 8.8    Eosinophils %      % 1.5    Basophils %      % 0.7    Immature Granulocyte %      % 0.1    Glucose      70 - 99 mg/dL 76    Sodium      136 - 145 mmol/L 139    Potassium      3.5 - 5.1 mmol/L 3.8    Chloride      98 - 112 mmol/L 103    Carbon Dioxide, Total      21.0 - 32.0 mmol/L 29.0    ANION GAP      0 - 18 mmol/L 7    BUN      7 - 18 mg/dL 12    CREATININE      0.55 - 1.02 mg/dL 0.83    BUN/CREATININE RATIO      10.0 - 20.0  14.5    CALCIUM      8.5 - 10.1 mg/dL 9.3    CALCULATED OSMOLALITY      275 - 295 mOsm/kg 287    EGFR      >=60 mL/min/1.73m2 91    ALT (SGPT)      13 - 56 U/L 29    AST (SGOT)      15 - 37 U/L 17    ALKALINE PHOSPHATASE      37 - 98 U/L 45    Total Bilirubin      0.1 - 2.0 mg/dL 0.4    PROTEIN, TOTAL      6.4 - 8.2 g/dL 7.9    Albumin      3.4 - 5.0 g/dL 3.9    Globulin      2.8 - 4.4 g/dL 4.0    A/G Ratio      1.0 - 2.0  1.0    Patient Fasting for CMP? No    Color Urine      Yellow  Colorless !    Clarity Urine      Clear  Clear    Spec Gravity      1.005 - 1.030  1.014    Glucose Urine      Normal mg/dL Normal    Bilirubin Urine      Negative  Negative    Ketones, UA      Negative mg/dL Negative    Blood Urine      Negative  Negative    PH Urine       5.0 - 8.0  7.5    Protein Urine      Negative mg/dL Negative    Urobilinogen Urine      Normal  Normal    Nitrite Urine      Negative  Negative    Leukocyte Esterase       Negative  Negative    Microscopic Microscopic not indicated    T4,Free (Direct)      0.8 - 1.7 ng/dL 1.0    TSH      0.358 - 3.740 mIU/mL 0.567    Anti-SSA Antibody, IGG      <7 U/mL <0.4    Anti-SSB Antibody, IGG      <7 U/mL <0.4    VITAMIN D, 25-OH, TOTAL      30.0 - 100.0 ng/mL 71.8       Component      Latest Ref [facility] 6/15/2023   Testosterone Tot LC/MS      10.0 - 55.0 ng/dL 24.6    Testost % Free+Weak Bnd      3.0 - 18.0 % 2.4 (L)    Testost Free+Weak Bnd      0.0 - 9.5 ng/dL 0.6    Sex Horm Bind Glob      24.6 - 122.0 nmol/L 265.0 (H)    Expanded HERSON Antibody Screen, IGG      <0.7 ug/l 0.40    Anti-dsDNA antibody      <10 IU/mL 59.2 (H)    Connective Tissue Disease Screen Interpretation      Negative  Positive !    FERRITIN      12.0 - 160.0 ng/mL 23.6    C-REACTIVE PROTEIN      <0.30 mg/dL <0.29    SED RATE      0 - 20 mm/Hr 10    VITAMIN D, 25-OH, TOTAL      30.0 - 100.0 ng/mL 9.8 (L)       Component      Latest Ref [facility] 12/16/2023   Lupus Anticoag Screen Interp Conclusion: Negative…    PT      11.6 - 14.8 seconds 12.0    APTT      23.3 - 35.6 seconds 25.3    Hexagonal Phase Phospholipid      Negative  Negative    DRVVT Ratio      0.0 - 1.1  0.9    Anti-U1RNP Antibody, IGG      <5 U/mL 2.0    Anti-RNP70 Antibody, IGG      <7 U/mL 1.0    Cardiolipin IgG Antibody      <10 GPL 2.2    Cardiolipin IgM Antibody      <10 MPL 1.2    BETA 2 GLYCOPROTEIN 1 AB, IgG      <7 U/mL 1.3    BETA 2 GLYCOPROTEIN 1 AB, IgM      <7 U/mL <2.4    ROSA MARIA Titer/Pattern      <80  160 !    Reviewed By: Hiram Ledbetter M.D.    CK      34 - 145 U/L 116    Aldolase      3.3 - 10.3 U/L 2.7 (L)    C-REACTIVE PROTEIN      <1.00 mg/dL <0.40    SED RATE      0 - 20 mm/Hr 14    ROSA MARIA SCREEN      Negative  Positive !    Anti-Smith Antibody, IGG      <7 U/mL <0.7    Anti Double  Strand DNA      <10  <10    Anti-SCL70 Antibody, IGG      <7 U/mL 0.7    RHEUMATOID FACTOR      <14 IU/mL <10    COMPLEMENT C3      90.0 - 170.0 mg/dL 138.9    COMPLEMENT C4      12.0 - 36.0 mg/dL 18.6       Comment: 160 Speckled and 160 Nucleolar RAO     6/15/2023 - complete PFTs -   Moderate obstructive defect seen with significant postbronchodilator response observed.  Normal lung volumes.  Normal diffusion capacity.    5/21/2018 - scalp skin biopsy  BOTH SPECIMENS HAVE SIMILAR FINDINGS SHOWING PERIFOLLICULAR AND   SUPERFICIAL PERIVASCULAR LYMPHOCYTIC INFILTRATE WITH RARE   EOSINOPHILS, MORE EOSINOPHILS ARE SEEN IN SPECIMEN B, DERMAL   FIBROSIS, RARE FOREIGN BODY GIANT CELL REACTION TO FREE HAIR   SHAFT. THE FINDINGS ARE SUSPICIOUS FOR LICHEN PLANO PILARIS WITH   A CONCOMMITANT ECZEMATOUS DERMATITIS. THE HISTOLOGIC DIFFERENTIAL   DIAGNOSIS ALSO INCLUDE EARLY/EVOLVING PATTERN ALOPECIA.   CLINICOPATHOLOGIC CORRELATION IS RECOMMENDED.  NO FUNGAL   ORGANISMS ARE IDENTIFIED WITH PAS STAIN. MULTIPLE LEVELS   EXAMINED.     ASSESSMENT AND PLAN:   Roula Marshall is a 38 year old female who presents for   Chief Complaint   Patient presents with    Lab Results    Hand Pain     B/L, very painful and sensitive and finger tips      Alopecia, ds dna positive, postiive rao , new onset Raynaud's,     - Repeat ds dna is negative.   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   She should follow up with derm     2. Raynaud's - reviewed techniquest ot stay warm - no color changes   - checkcyroglobulins kale if hand pain increases   - but she delcines  calcium channel blcoker     Summary:  1 ,check labs if hand pain worsens  2. Would recommend a new biopsy   3. Follow up with derm again  4. info raynaud's     Jose G Kimbrough MD  1/25/2024   12:31 PM

## 2024-02-05 ENCOUNTER — LAB ENCOUNTER (OUTPATIENT)
Dept: LAB | Facility: HOSPITAL | Age: 39
End: 2024-02-05
Attending: INTERNAL MEDICINE
Payer: MEDICAID

## 2024-02-05 DIAGNOSIS — I73.00 RAYNAUD'S DISEASE WITHOUT GANGRENE: ICD-10-CM

## 2024-02-05 DIAGNOSIS — M79.642 PAIN IN BOTH HANDS: ICD-10-CM

## 2024-02-05 DIAGNOSIS — M79.641 PAIN IN BOTH HANDS: ICD-10-CM

## 2024-02-05 PROCEDURE — 36415 COLL VENOUS BLD VENIPUNCTURE: CPT

## 2024-02-05 PROCEDURE — 86200 CCP ANTIBODY: CPT

## 2024-02-05 PROCEDURE — 82595 ASSAY OF CRYOGLOBULIN: CPT

## 2024-02-06 LAB — CCP IGG SERPL-ACNC: 1.1 U/ML (ref 0–6.9)

## 2024-03-29 ENCOUNTER — OFFICE VISIT (OUTPATIENT)
Dept: INTERNAL MEDICINE CLINIC | Facility: CLINIC | Age: 39
End: 2024-03-29
Payer: MEDICAID

## 2024-03-29 ENCOUNTER — LAB ENCOUNTER (OUTPATIENT)
Dept: LAB | Age: 39
End: 2024-03-29
Attending: INTERNAL MEDICINE
Payer: MEDICAID

## 2024-03-29 VITALS
HEIGHT: 66 IN | HEART RATE: 82 BPM | SYSTOLIC BLOOD PRESSURE: 117 MMHG | WEIGHT: 173 LBS | BODY MASS INDEX: 27.8 KG/M2 | DIASTOLIC BLOOD PRESSURE: 74 MMHG

## 2024-03-29 DIAGNOSIS — Z00.00 PHYSICAL EXAM: ICD-10-CM

## 2024-03-29 DIAGNOSIS — Z00.00 PHYSICAL EXAM: Primary | ICD-10-CM

## 2024-03-29 DIAGNOSIS — J45.30 MILD PERSISTENT ASTHMA WITHOUT COMPLICATION (HCC): ICD-10-CM

## 2024-03-29 DIAGNOSIS — N76.1 SUBACUTE VAGINITIS: ICD-10-CM

## 2024-03-29 DIAGNOSIS — J45.41 MODERATE PERSISTENT ASTHMA WITH ACUTE EXACERBATION (HCC): ICD-10-CM

## 2024-03-29 LAB
ALBUMIN SERPL-MCNC: 4.2 G/DL (ref 3.2–4.8)
ALBUMIN/GLOB SERPL: 1.4 {RATIO} (ref 1–2)
ALP LIVER SERPL-CCNC: 48 U/L
ALT SERPL-CCNC: 14 U/L
ANION GAP SERPL CALC-SCNC: 8 MMOL/L (ref 0–18)
AST SERPL-CCNC: 19 U/L (ref ?–34)
BASOPHILS # BLD AUTO: 0.04 X10(3) UL (ref 0–0.2)
BASOPHILS NFR BLD AUTO: 0.8 %
BILIRUB SERPL-MCNC: 0.5 MG/DL (ref 0.3–1.2)
BUN BLD-MCNC: 12 MG/DL (ref 9–23)
BUN/CREAT SERPL: 14.3 (ref 10–20)
CALCIUM BLD-MCNC: 9 MG/DL (ref 8.7–10.4)
CHLORIDE SERPL-SCNC: 109 MMOL/L (ref 98–112)
CHOLEST SERPL-MCNC: 189 MG/DL (ref ?–200)
CO2 SERPL-SCNC: 26 MMOL/L (ref 21–32)
CREAT BLD-MCNC: 0.84 MG/DL
DEPRECATED RDW RBC AUTO: 41.1 FL (ref 35.1–46.3)
EGFRCR SERPLBLD CKD-EPI 2021: 90 ML/MIN/1.73M2 (ref 60–?)
EOSINOPHIL # BLD AUTO: 0.13 X10(3) UL (ref 0–0.7)
EOSINOPHIL NFR BLD AUTO: 2.7 %
ERYTHROCYTE [DISTWIDTH] IN BLOOD BY AUTOMATED COUNT: 12.4 % (ref 11–15)
EST. AVERAGE GLUCOSE BLD GHB EST-MCNC: 105 MG/DL (ref 68–126)
FASTING PATIENT LIPID ANSWER: YES
FASTING STATUS PATIENT QL REPORTED: YES
GLOBULIN PLAS-MCNC: 3.1 G/DL (ref 2.8–4.4)
GLUCOSE BLD-MCNC: 85 MG/DL (ref 70–99)
HBA1C MFR BLD: 5.3 % (ref ?–5.7)
HCT VFR BLD AUTO: 42.8 %
HDLC SERPL-MCNC: 86 MG/DL (ref 40–59)
HGB BLD-MCNC: 13.4 G/DL
IMM GRANULOCYTES # BLD AUTO: 0.01 X10(3) UL (ref 0–1)
IMM GRANULOCYTES NFR BLD: 0.2 %
LDLC SERPL CALC-MCNC: 92 MG/DL (ref ?–100)
LYMPHOCYTES # BLD AUTO: 1.43 X10(3) UL (ref 1–4)
LYMPHOCYTES NFR BLD AUTO: 29.4 %
MCH RBC QN AUTO: 28.4 PG (ref 26–34)
MCHC RBC AUTO-ENTMCNC: 31.3 G/DL (ref 31–37)
MCV RBC AUTO: 90.7 FL
MONOCYTES # BLD AUTO: 0.42 X10(3) UL (ref 0.1–1)
MONOCYTES NFR BLD AUTO: 8.6 %
NEUTROPHILS # BLD AUTO: 2.84 X10 (3) UL (ref 1.5–7.7)
NEUTROPHILS # BLD AUTO: 2.84 X10(3) UL (ref 1.5–7.7)
NEUTROPHILS NFR BLD AUTO: 58.3 %
NONHDLC SERPL-MCNC: 103 MG/DL (ref ?–130)
OSMOLALITY SERPL CALC.SUM OF ELEC: 295 MOSM/KG (ref 275–295)
PLATELET # BLD AUTO: 287 10(3)UL (ref 150–450)
POTASSIUM SERPL-SCNC: 4.6 MMOL/L (ref 3.5–5.1)
PROT SERPL-MCNC: 7.3 G/DL (ref 5.7–8.2)
RBC # BLD AUTO: 4.72 X10(6)UL
SODIUM SERPL-SCNC: 143 MMOL/L (ref 136–145)
TRIGL SERPL-MCNC: 60 MG/DL (ref 30–149)
VIT D+METAB SERPL-MCNC: 16 NG/ML (ref 30–100)
VLDLC SERPL CALC-MCNC: 10 MG/DL (ref 0–30)
WBC # BLD AUTO: 4.9 X10(3) UL (ref 4–11)

## 2024-03-29 PROCEDURE — 99396 PREV VISIT EST AGE 40-64: CPT | Performed by: INTERNAL MEDICINE

## 2024-03-29 PROCEDURE — 82306 VITAMIN D 25 HYDROXY: CPT

## 2024-03-29 PROCEDURE — 83036 HEMOGLOBIN GLYCOSYLATED A1C: CPT

## 2024-03-29 PROCEDURE — 99213 OFFICE O/P EST LOW 20 MIN: CPT | Performed by: INTERNAL MEDICINE

## 2024-03-29 PROCEDURE — 36415 COLL VENOUS BLD VENIPUNCTURE: CPT

## 2024-03-29 PROCEDURE — 80053 COMPREHEN METABOLIC PANEL: CPT

## 2024-03-29 PROCEDURE — 85025 COMPLETE CBC W/AUTO DIFF WBC: CPT

## 2024-03-29 PROCEDURE — 80061 LIPID PANEL: CPT

## 2024-03-29 RX ORDER — FLUTICASONE PROPIONATE AND SALMETEROL 250; 50 UG/1; UG/1
1 POWDER RESPIRATORY (INHALATION) EVERY 12 HOURS SCHEDULED
Qty: 1 EACH | Refills: 2 | Status: SHIPPED | OUTPATIENT
Start: 2024-03-29 | End: 2024-04-03

## 2024-03-29 RX ORDER — ALBUTEROL SULFATE 90 UG/1
2 AEROSOL, METERED RESPIRATORY (INHALATION) EVERY 4 HOURS PRN
Qty: 3 EACH | Refills: 1 | Status: SHIPPED | OUTPATIENT
Start: 2024-03-29

## 2024-03-30 LAB
BV BACTERIA DNA VAG QL NAA+PROBE: NEGATIVE
C GLABRATA DNA VAG QL NAA+PROBE: NEGATIVE
C KRUSEI DNA VAG QL NAA+PROBE: NEGATIVE
CANDIDA DNA VAG QL NAA+PROBE: NEGATIVE
T VAGINALIS DNA VAG QL NAA+PROBE: NEGATIVE

## 2024-03-31 ENCOUNTER — TELEPHONE (OUTPATIENT)
Dept: INTERNAL MEDICINE CLINIC | Facility: CLINIC | Age: 39
End: 2024-03-31

## 2024-03-31 DIAGNOSIS — J45.41 MODERATE PERSISTENT ASTHMA WITH ACUTE EXACERBATION (HCC): Primary | ICD-10-CM

## 2024-04-01 NOTE — TELEPHONE ENCOUNTER
Denied    Details of this decision are provided on the physician outcome notice which has been faxed to the number on file.   Case ID: [id number]      Payer: [employer] [facility]    [phone]   Electronic appeal: Not supported   View History

## 2024-04-02 NOTE — TELEPHONE ENCOUNTER
Can I please have the name of the preferred drug so I can choose   - Likely in setting of volume overload vs. reactive airway 2/2 recent infection   - CXR w/ findings reflective of volume OL +/- pneumonia  - Symbicort, standing duonebs  - Pulmonology following   - Prednisone 40 mg PO daily, will likely require extended taper  - Atovaquone started given need for long steroid taper  - D/c Zosyn   - Repeat CT chest with no pneumonia   - Patient with exposure to COVID from roommate, however, RVP (-) x3.     #Hx of SALVATORE  - CPAP at night

## 2024-04-08 NOTE — PROGRESS NOTES
HPI:    Patient ID: Roula Marshall is a 38 year old female.    Vaginal Discharge  The patient's primary symptoms include vaginal discharge. Pertinent negatives include no abdominal pain, back pain, chills, constipation, diarrhea, dysuria, fever, frequency, headaches, hematuria, nausea, rash, sore throat, urgency or vomiting.       Physical exam  Generally healthy    Having vaginal disharge  No abnormal bleeding      Follow up asthma  need refills  Stable    /74 (BP Location: Right arm, Patient Position: Sitting, Cuff Size: adult)   Pulse 82   Ht 5' 6\" (1.676 m)   Wt 173 lb (78.5 kg)   LMP 03/20/2024 (Approximate)   BMI 27.92 kg/m²   Wt Readings from Last 6 Encounters:   03/29/24 173 lb (78.5 kg)   01/03/24 166 lb (75.3 kg)   12/16/23 166 lb (75.3 kg)   11/07/23 168 lb (76.2 kg)   10/24/23 167 lb (75.8 kg)   10/20/23 167 lb (75.8 kg)     Body mass index is 27.92 kg/m².  HGBA1C:    Lab Results   Component Value Date    A1C 5.3 03/29/2024    A1C 5.3 11/22/2022    A1C 5.6 09/16/2021     03/29/2024         Review of Systems   Constitutional:  Negative for activity change, chills, fatigue and fever.   HENT:  Negative for ear discharge, nosebleeds, postnasal drip, rhinorrhea, sinus pressure and sore throat.    Eyes:  Negative for pain, discharge and redness.   Respiratory:  Positive for cough. Negative for chest tightness, shortness of breath and wheezing.    Cardiovascular:  Negative for chest pain, palpitations and leg swelling.   Gastrointestinal:  Negative for abdominal pain, blood in stool, constipation, diarrhea, nausea and vomiting.   Genitourinary:  Positive for vaginal discharge. Negative for difficulty urinating, dysuria, frequency, hematuria and urgency.   Musculoskeletal:  Negative for back pain, gait problem and joint swelling.   Skin:  Negative for rash.   Neurological:  Negative for syncope, weakness, light-headedness and headaches.   Psychiatric/Behavioral:  Negative for dysphoric mood.  The patient is not nervous/anxious.          Current Outpatient Medications   Medication Sig Dispense Refill    albuterol 108 (90 Base) MCG/ACT Inhalation Aero Soln Inhale 2 puffs into the lungs every 4 (four) hours as needed for Wheezing. 3 each 1    Desogestrel-Ethinyl Estradiol (ISIBLOOM) 0.15-30 MG-MCG Oral Tab Take 1 tablet by mouth daily. 84 tablet 3    Multiple Vitamin (MULTIVITAMIN ADULT OR) Take by mouth.      Mometasone Furo-Formoterol Fum (DULERA) 100-5 MCG/ACT Inhalation Aerosol 2 PUFFS BID 1 each 0    ergocalciferol 1.25 MG (28997 UT) Oral Cap Take 1 capsule (50,000 Units total) by mouth every 7 days. 12 capsule 0    tacrolimus (PROTOPIC) 0.1 % External Ointment Apply 1 Application topically 2 (two) times daily. (Patient not taking: Reported on 3/29/2024) 60 g 3     Allergies:  Allergies   Allergen Reactions    Egg RESPIRATORY FAILURE and ANAPHYLAXIS    Seafood UNKNOWN    Shellfish RESPIRATORY FAILURE       HISTORY:  Past Medical History:   Diagnosis Date    AMA 19  MFM EFW = 57% 20  [place] low risk male.   2020; Options of FTS, CVS, amnio, genetic counseling, Level 1 vs level 2 u/s reviewed -- wishes for level 2 u/s. Undecided re: FTS If 35-40 y/o by EDC, then [  ] [facility] u/s at 32 wks [  ] weekly NST at 34      Asthma ([facility])     Decorative tattoo     Eczema     Lichen planopilaris     Nasal mass     benign, excision of mass    Nasal polyposis 7/15/2016    Nasal polyposis 7/15/2016    Positive GBS test 2021    Labor prophylaxis    Varicose veins of legs       Past Surgical History:   Procedure Laterality Date          OTHER SURGICAL HISTORY      excision nasal mass, benign      Family History   Problem Relation Age of Onset    Diabetes Other     Other (Other[other]) Mother         allergies, asthma, eczema    Diabetes Mother     Hypertension Brother     Other (Other[other]) Brother         asthma, eczema      Social History:   Social History     Socioeconomic  History    Marital status:    Tobacco Use    Smoking status: Never     Passive exposure: Never    Smokeless tobacco: Never   Vaping Use    Vaping Use: Never used   Substance and Sexual Activity    Alcohol use: Not Currently     Alcohol/week: 0.0 standard drinks of alcohol    Drug use: Never    Sexual activity: Yes     Partners: Male     Birth control/protection: OCP   Other Topics Concern    Caffeine Concern No    Breast feeding No    Reaction to local anesthetic No    Pt has a pacemaker No    Pt has a defibrillator No        PHYSICAL EXAM:    Physical Exam  Constitutional:       General: She is not in acute distress.     Appearance: She is well-developed. She is not diaphoretic.   HENT:      Head: Normocephalic and atraumatic.      Right Ear: Ear canal normal.      Left Ear: Ear canal normal.      Nose: Nose normal.      Mouth/Throat:      Pharynx: No oropharyngeal exudate or posterior oropharyngeal erythema.   Eyes:      General: No scleral icterus.        Right eye: No discharge.         Left eye: No discharge.      Conjunctiva/sclera: Conjunctivae normal.      Pupils: Pupils are equal, round, and reactive to light.   Cardiovascular:      Rate and Rhythm: Normal rate and regular rhythm.      Heart sounds: Normal heart sounds. No murmur heard.  Pulmonary:      Effort: Pulmonary effort is normal. No respiratory distress.      Breath sounds: Normal breath sounds. No wheezing.   Abdominal:      General: Bowel sounds are normal.      Palpations: Abdomen is soft. There is no mass.      Tenderness: There is no abdominal tenderness. There is no guarding or rebound.      Hernia: No hernia is present.   Genitourinary:     Comments: Vaginal mucosa pink  Scant milky thin white disrge  Cervix is normal  Endocervical pap done.       Musculoskeletal:         General: No tenderness.   Skin:     General: Skin is warm and dry.      Findings: No lesion or rash.   Neurological:      Mental Status: She is alert.      Gait:  Gait normal.   Psychiatric:         Behavior: Behavior normal.         Thought Content: Thought content normal.              ASSESSMENT/PLAN:   (Z00.00) Physical exam  (primary encounter diagnosis)  Plan: CBC With Differential With Platelet, Comp         Metabolic Panel (14), Lipid Panel, Hemoglobin         A1C, Vitamin D        Generally healthy    (J45.41) Moderate persistent asthma with acute exacerbation (HCC)  Plan: DISCONTINUED: fluticasone-salmeterol (ADVAIR         DISKUS) 250-50 MCG/ACT Inhalation Aerosol         Powder, Breath Activated        Controlled monitor  refill med    Mild persistent asthma without exacerbation  Cpm   refill meds    (N76.1) Subacute vaginitis  Plan: Vaginitis Vaginosis PCR Panel, Vaginitis         Vaginosis PCR Panel        Vaginosis screen obtained    Patient voiced understanding  and agrees with plan         Orders Placed This Encounter   Procedures    CBC With Differential With Platelet    Comp Metabolic Panel (14)    Lipid Panel    Hemoglobin A1C    Vitamin D    Vaginitis Vaginosis PCR Panel       Meds This Visit:  Requested Prescriptions     Signed Prescriptions Disp Refills    albuterol 108 (90 Base) MCG/ACT Inhalation Aero Soln 3 each 1     Sig: Inhale 2 puffs into the lungs every 4 (four) hours as needed for Wheezing.       Imaging & Referrals:  None        ID#[id number]

## 2024-07-05 RX ORDER — ERGOCALCIFEROL 1.25 MG/1
50000 CAPSULE ORAL
Qty: 6 CAPSULE | Refills: 0 | Status: SHIPPED | OUTPATIENT
Start: 2024-07-05

## 2024-07-05 NOTE — TELEPHONE ENCOUNTER
Please review; protocol failed/No Protocol    Last Office Visit: 03/29/2024    Last Vitamin D labs: 03/29/2024  Component  Ref Range & Units 3/29/24  9:32 AM   Vitamin D, 25OH, Total  30.0 - 100.0 ng/mL 16.0 Low      Requested Prescriptions   Pending Prescriptions Disp Refills    ERGOCALCIFEROL 1.25 MG (80635 UT) Oral Cap [Pharmacy Med Name: VITAMIN D2 50,000IU (ERGO) CAP RX] 12 capsule 0     Sig: TAKE 1 CAPSULE BY MOUTH EVERY 7 DAYS       There is no refill protocol information for this order        Future Appointments         Provider Department Appt Notes    In 6 months Armando Wells DO [facility] - OB/GYN Annual    In 8 months Deborah Pringle MD [facility] Annual  Lst px 3/29/24          Recent Outpatient Visits              3 months ago Physical exam    [facility] Deborah Pringle MD    Office Visit    5 months ago Positive ROSA MARIA (antinuclear antibody)    [facility] Palaniswami, Purani, MD    Office Visit    6 months ago Well woman exam    [facility] - OB/GYN Armando Wells DO    Office Visit    6 months ago [facility]    Office Visit    6 months ago Positive ROS[facility]inuclear antibody[facility]    Office Visit

## 2024-10-02 NOTE — TELEPHONE ENCOUNTER
From: Nehemiah Thorpe  Sent: 12/10/2017 12:05 PM CST  Subject: Medication Renewal Request    Liyah Key Model would like a refill of the following medications:     Desogestrel-Ethinyl Estradiol (DESOGEN) 0.15-30 MG-MCG Oral Tab Tresea Poster, DO]   Patient Comment: Can I please have a refill on this medication. I am at start my pills up again today and pharmacy informed me that I am out of refills. I don't see my OB/GYNE doctor until 12-29-17.  Thank you!!    Preferred pharmacy: [address], [address] AT [address] [place] [address], [phone], [phone] The note attached was reviewed, and it accurately reflects the encounter.  GALINA Cee

## 2024-10-06 NOTE — TELEPHONE ENCOUNTER
Please review. Protocol Failed; No Protocol    Medication(s) to Refill:   Requested Prescriptions     Pending Prescriptions Disp Refills    ERGOCALCIFEROL 1.25 MG (17284 UT) Oral Cap [Pharmacy Med Name: VITAMIN D2 50,000IU (ERGO) CAP RX] 6 capsule 0     Sig: TAKE 1 CAPSULE BY MOUTH EVERY 14 DAYS         Reason for Medication Refill being sent to Provider / Reason Protocol Failed:  [x] Non-Protocol Medication        Recent Labs:  Lab Results   Component Value Date    VITD 16.0 (L) 03/29/2024    UCQS59II 13.0 02/16/2015              Requested Prescriptions   Pending Prescriptions Disp Refills    ERGOCALCIFEROL 1.25 MG (78849 UT) Oral Cap [Pharmacy Med Name: VITAMIN D2 50,000IU (ERGO) CAP RX] 6 capsule 0     Sig: TAKE 1 CAPSULE BY MOUTH EVERY 14 DAYS       There is no refill protocol information for this order            Future Appointments         Provider Department Appt Notes    In 2 weeks [address] [facility]     In 2 months [facility]    In 5 months Deborah Pringle MD [facility]  Lst px 3/29/24          Recent Outpatient Visits              6 months ago [facility] exam    [facility]le [facility]    Office Visit    8 months ago [facility] ROS[facility]inuclear [facility]    [facility] Visit    9 months ago Well woman exam    [facility] - OB/GYN Armando Wells DO    Office Visit    9 months ago Alopecia    [facility] Lencho Venegas PA-C    Office Visit    9 months ago Positive ROSA MARIA (antinuclear antibody)    [facility]    Office Visit

## 2024-10-09 RX ORDER — ERGOCALCIFEROL 1.25 MG/1
50000 CAPSULE, LIQUID FILLED ORAL
Qty: 6 CAPSULE | Refills: 0 | Status: SHIPPED | OUTPATIENT
Start: 2024-10-09

## 2024-10-26 ENCOUNTER — HOSPITAL ENCOUNTER (OUTPATIENT)
Dept: MAMMOGRAPHY | Facility: HOSPITAL | Age: 39
Discharge: HOME | End: 2024-10-26
Attending: INTERNAL MEDICINE
Payer: COMMERCIAL

## 2024-10-26 DIAGNOSIS — Z12.31 ENCOUNTER FOR SCREENING MAMMOGRAM FOR BREAST CANCER: ICD-10-CM

## 2024-10-26 PROCEDURE — 77063 BREAST TOMOSYNTHESIS BI: CPT | Performed by: INTERNAL MEDICINE

## 2024-10-26 PROCEDURE — 77067 SCR MAMMO BI INCL CAD: CPT | Performed by: INTERNAL MEDICINE

## 2024-11-05 DIAGNOSIS — L65.9 ALOPECIA: ICD-10-CM

## 2024-11-05 RX ORDER — DESOGESTREL AND ETHINYL ESTRADIOL 0.15-0.03
1 KIT ORAL DAILY
Qty: 84 TABLET | Refills: 3 | OUTPATIENT
Start: 2024-11-05

## 2024-11-05 NOTE — TELEPHONE ENCOUNTER
Refill Request for medication(s):     Last Office Visit: 12/16/23    Last Refill: 12/16/23    Pharmacy, Dosage verified: yes    Condition Update (if applicable): requested    Rx pended and sent to provider for approval, please advise. Thank You!

## 2024-11-06 RX ORDER — DESOGESTREL AND ETHINYL ESTRADIOL 0.15-0.03
1 KIT ORAL DAILY
Qty: 84 TABLET | Refills: 0 | Status: SHIPPED | OUTPATIENT
Start: 2024-11-06

## 2024-11-12 RX ORDER — TACROLIMUS 1 MG/G
1 OINTMENT TOPICAL 2 TIMES DAILY
Qty: 60 G | Refills: 3 | OUTPATIENT
Start: 2024-11-12

## 2024-12-05 ENCOUNTER — NURSE TRIAGE (OUTPATIENT)
Dept: INTERNAL MEDICINE CLINIC | Facility: CLINIC | Age: 39
End: 2024-12-05

## 2024-12-05 NOTE — TELEPHONE ENCOUNTER
Action Requested: Summary for Provider     []  Critical Lab, Recommendations Needed  [] Need Additional Advice  []   FYI    []   Need Orders  [] Need Medications Sent to Pharmacy  []  Other     SUMMARY: patient self scheduled via my chart for her asthma which per patient is requiring her to use her inhaler and home nebulizer more frequently. She is currently at work and advised with the colder weather she is a bit more short of breath. She is talking in complete sentences on the call. I offered to  schedule her a sooner appointment but she declined and would prefer to keep the one she scheduled for this Saturday. Advised per protocol and if she should worsen discussed RED flags for Urgent Care/ ER.    Reason for call: Asthma and Shortness Of Breath  Onset: Data Unavailable                   Reason for Disposition   Mild wheezing comes and goes and lasts > 3 days    Protocols used: Asthma Attack-A-OH

## 2024-12-07 ENCOUNTER — OFFICE VISIT (OUTPATIENT)
Dept: INTERNAL MEDICINE CLINIC | Facility: CLINIC | Age: 39
End: 2024-12-07
Payer: COMMERCIAL

## 2024-12-07 VITALS
OXYGEN SATURATION: 100 % | BODY MASS INDEX: 30.22 KG/M2 | DIASTOLIC BLOOD PRESSURE: 80 MMHG | HEART RATE: 88 BPM | HEIGHT: 66 IN | WEIGHT: 188 LBS | SYSTOLIC BLOOD PRESSURE: 127 MMHG

## 2024-12-07 DIAGNOSIS — J45.30 MILD PERSISTENT ASTHMA WITHOUT COMPLICATION (HCC): Primary | ICD-10-CM

## 2024-12-07 PROCEDURE — 3079F DIAST BP 80-89 MM HG: CPT | Performed by: NURSE PRACTITIONER

## 2024-12-07 PROCEDURE — 3008F BODY MASS INDEX DOCD: CPT | Performed by: NURSE PRACTITIONER

## 2024-12-07 PROCEDURE — 3074F SYST BP LT 130 MM HG: CPT | Performed by: NURSE PRACTITIONER

## 2024-12-07 PROCEDURE — 99214 OFFICE O/P EST MOD 30 MIN: CPT | Performed by: NURSE PRACTITIONER

## 2024-12-07 RX ORDER — BENZONATATE 100 MG/1
100 CAPSULE ORAL 3 TIMES DAILY PRN
Qty: 20 CAPSULE | Refills: 0 | Status: SHIPPED | OUTPATIENT
Start: 2024-12-07 | End: 2024-12-14

## 2024-12-07 RX ORDER — FLUTICASONE PROPIONATE AND SALMETEROL 500; 50 UG/1; UG/1
1 POWDER RESPIRATORY (INHALATION) 2 TIMES DAILY
Qty: 60 EACH | Refills: 0 | Status: SHIPPED | OUTPATIENT
Start: 2024-12-07 | End: 2025-01-06

## 2024-12-07 RX ORDER — PREDNISONE 10 MG/1
TABLET ORAL
Qty: 30 TABLET | Refills: 0 | Status: SHIPPED | OUTPATIENT
Start: 2024-12-07

## 2024-12-07 NOTE — ASSESSMENT & PLAN NOTE
Breathing better.  No wheezing    Plan    benzonatate 100 MG Oral Cap          Take 1 capsule (100 mg total) by mouth 3 (three) times daily as needed for cough., Normal, Disp-20 capsule, R-0       fluticasone-salmeterol (ADVAIR DISKUS) 500-50 MCG/ACT Inhalation Aerosol Powder, Breath Activated         Inhale 1 puff into the lungs 2 (two) times daily., Normal, Disp-60 each, R-0       predniSONE 10 MG Oral Tab         4 tabs daily for 3 days, then 3 tabs daily for 3 days, then 2 tabs daily for 3 days, 1 tab daily for 3 days., Normal, Disp-30 tablet, R-0

## 2024-12-07 NOTE — PROGRESS NOTES
HPI:    Patient ID: Roula Marshall is a 39 year old female.    HPI Asthma/Cough  39 year old female I have seen in the past.  She has a Hx of Asthma.  She is having difficulty breathing and a dry cough.  COVID-19  She is using a nebulizer  She used her sisters Prednisone 5 mg for four days.        Immunization History   Administered Date(s) Administered    Pneumococcal Conjugate PCV20 2022    TDAP 2019   Deferred Date(s) Deferred    FLULAVAL 6 months & older 0.5 ml Prefilled syringe ([id number]) 2022       Past Medical History:    AMA    19  MFM EFW = 57% 20  [place] low risk male.   2020; Options of FTS, CVS, amnio, genetic counseling, Level 1 vs level 2 u/s reviewed -- wishes for level 2 u/s. Undecided re: FTS If 35-40 y/o by EDC, then [  ] [facility] u/s at 32 wks [  ] weekly NST at 34      Asthma (HCC)    Decorative tattoo    Eczema    Lichen planopilaris    Nasal mass    benign, excision of mass    Nasal polyposis    Nasal polyposis    Positive GBS test    Labor prophylaxis    Varicose veins of legs      Past Surgical History:   Procedure Laterality Date          Other surgical history      excision nasal mass, benign      Social History     Socioeconomic History    Marital status:    Tobacco Use    Smoking status: Never     Passive exposure: Never    Smokeless tobacco: Never   Vaping Use    Vaping status: Never Used   Substance and Sexual Activity    Alcohol use: Not Currently     Alcohol/week: 0.0 standard drinks of alcohol    Drug use: Never    Sexual activity: Yes     Partners: Male     Birth control/protection: OCP   Other Topics Concern    Caffeine Concern No    Breast feeding No    Reaction to local anesthetic No    Pt has a pacemaker No    Pt has a defibrillator No          Review of Systems   Constitutional:  Negative for chills, fatigue and fever.   HENT:  Negative for ear pain, hearing loss, sinus pain, sore throat and trouble swallowing.    Eyes:  Negative for pain  and visual disturbance.   Respiratory:  Positive for cough. Negative for chest tightness and shortness of breath.    Cardiovascular:  Negative for chest pain, palpitations and leg swelling.   Gastrointestinal:  Negative for abdominal pain, constipation, diarrhea, nausea and vomiting.   Endocrine: Negative for cold intolerance and heat intolerance.   Genitourinary:  Negative for dysuria and hematuria.   Musculoskeletal:  Negative for back pain and joint swelling.   Skin:  Negative for rash.   Allergic/Immunologic: Negative for environmental allergies.   Neurological:  Negative for weakness, numbness and headaches.   Hematological:  Does not bruise/bleed easily.   Psychiatric/Behavioral:  Negative for dysphoric mood and sleep disturbance. The patient is not nervous/anxious.               Current Outpatient Medications   Medication Sig Dispense Refill    fluticasone-salmeterol (ADVAIR DISKUS) 500-50 MCG/ACT Inhalation Aerosol Powder, Breath Activated Inhale 1 puff into the lungs 2 (two) times daily. 60 each 0    predniSONE 10 MG Oral Tab 4 tabs daily for 3 days, then 3 tabs daily for 3 days, then 2 tabs daily for 3 days, 1 tab daily for 3 days. 30 tablet 0    benzonatate 100 MG Oral Cap Take 1 capsule (100 mg total) by mouth 3 (three) times daily as needed for cough. 20 capsule 0    Desogestrel-Ethinyl Estradiol (ISIBLOOM) 0.15-30 MG-MCG Oral Tab Take 1 tablet by mouth daily. 84 tablet 0    albuterol 108 (90 Base) MCG/ACT Inhalation Aero Soln Inhale 2 puffs into the lungs every 4 (four) hours as needed for Wheezing. 3 each 1    Multiple Vitamin (MULTIVITAMIN ADULT OR) Take by mouth.      ergocalciferol 1.25 MG (52100 UT) Oral Cap Take 1 capsule (50,000 Units total) by mouth every 14 (fourteen) days. 6 capsule 0    Mometasone Furo-Formoterol Fum (DULERA) 100-5 MCG/ACT Inhalation Aerosol 2 PUFFS BID 1 each 0     Allergies:Allergies[1]   PHYSICAL EXAM:   Physical Exam  Constitutional:       Appearance: Normal appearance.  She is well-developed.   HENT:      Head: Normocephalic.      Right Ear: Tympanic membrane normal.      Left Ear: Tympanic membrane normal.      Nose: Nose normal.      Mouth/Throat:      Mouth: Mucous membranes are moist.      Pharynx: No oropharyngeal exudate or posterior oropharyngeal erythema.   Eyes:      General:         Right eye: No discharge.         Left eye: No discharge.      Pupils: Pupils are equal, round, and reactive to light.   Cardiovascular:      Rate and Rhythm: Normal rate and regular rhythm.      Heart sounds: Normal heart sounds. No murmur heard.     No friction rub. No gallop.   Pulmonary:      Effort: Pulmonary effort is normal. No respiratory distress.      Breath sounds: Normal breath sounds. No wheezing, rhonchi or rales.   Abdominal:      General: Bowel sounds are normal. There is no distension.      Palpations: Abdomen is soft. There is no mass.      Tenderness: There is no abdominal tenderness. There is no right CVA tenderness, left CVA tenderness or guarding.   Musculoskeletal:         General: No tenderness.      Cervical back: Normal range of motion and neck supple. No tenderness.      Right lower leg: No edema.      Left lower leg: No edema.   Lymphadenopathy:      Cervical: No cervical adenopathy.   Skin:     General: Skin is warm and dry.      Findings: No rash.   Neurological:      Mental Status: She is alert and oriented to person, place, and time.      Coordination: Coordination normal.      Gait: Gait normal.   Psychiatric:         Mood and Affect: Mood normal.         Behavior: Behavior normal.         Thought Content: Thought content normal.         Judgment: Judgment normal.       /80 (BP Location: Right arm, Patient Position: Sitting, Cuff Size: large)   Pulse 88   Ht 5' 6\" (1.676 m)   Wt 188 lb (85.3 kg)   LMP 10/02/2024 (Approximate)   SpO2 100%   BMI 30.34 kg/m²   Wt Readings from Last 2 Encounters:   12/07/24 188 lb (85.3 kg)   03/29/24 173 lb (78.5 kg)      Body mass index is 30.34 kg/m².(2)  Lab Results   Component Value Date    WBC 4.9 2024    RBC 4.72 2024    HGB 13.4 2024    HCT 42.8 2024    MCV 90.7 2024    MCH 28.4 2024    MCHC 31.3 2024    RDW 12.4 2024    .0 2024    MPV 9.3 2015      Lab Results   Component Value Date    GLU 85 2024    BUN 12 2024    BUNCREA 14.3 2024    CREATSERUM 0.84 2024    ANIONGAP 8 2024    GFRNAA 94 2021    GFRAA 108 2021    CA 9.0 2024    OSMOCALC 295 2024    ALKPHO 48 2024    AST 19 2024    ALT 14 2024    ALKPHOS 47 2015    BILT 0.5 2024    TP 7.3 2024    ALB 4.2 2024    GLOBULIN 3.1 2024    AGRATIO 1.4 2015     2024    K 4.6 2024     2024    CO2 26.0 2024      Lab Results   Component Value Date     2024    A1C 5.3 2024      Lab Results   Component Value Date    CHOLEST 189 2024    TRIG 60 2024    HDL 86 (H) 2024    LDL 92 2024    VLDL 10 2024    NONHDLC 103 2024    CALCNONHDL 100 2015      Lab Results   Component Value Date    T4F 1.0 2023    TSH 0.567 2023    TSHT4 0.46 2022                ASSESSMENT/PLAN:     Problem List Items Addressed This Visit    None         No orders of the defined types were placed in this encounter.      Meds This Visit:  Requested Prescriptions     Signed Prescriptions Disp Refills    fluticasone-salmeterol (ADVAIR DISKUS) 500-50 MCG/ACT Inhalation Aerosol Powder, Breath Activated 60 each 0     Sig: Inhale 1 puff into the lungs 2 (two) times daily.    predniSONE 10 MG Oral Tab 30 tablet 0     Si tabs daily for 3 days, then 3 tabs daily for 3 days, then 2 tabs daily for 3 days, 1 tab daily for 3 days.    benzonatate 100 MG Oral Cap 20 capsule 0     Sig: Take 1 capsule (100 mg total) by mouth 3 (three) times daily  as needed for cough.       Imaging & Referrals:  None         KRISTINE Camilo          [1]   Allergies  Allergen Reactions    Egg RESPIRATORY FAILURE and ANAPHYLAXIS    Seafood UNKNOWN    Shellfish RESPIRATORY FAILURE

## 2025-01-01 PROBLEM — Z12.4 SCREENING FOR CERVICAL CANCER: Status: RESOLVED | Noted: 2022-12-10 | Resolved: 2025-01-01

## 2025-01-01 NOTE — PROGRESS NOTES
GYN ANNUAL      HPI: Patient is a 39 year old   here for annual gyn exam. Cycles regular on OCPs. No pelvic pain. No abnormal vaginal discharge or bleeding. Sexually active with her . No issues with intercourse, bowel or bladder functioning.       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 60 each 0    predniSONE 10 MG Oral Tab 4 tabs daily for 3 days, then 3 tabs daily for 3 days, then 2 tabs daily for 3 days, 1 tab daily for 3 days. 30 tablet 0    Desogestrel-Ethinyl Estradiol (ISIBLOOM) 0.15-30 MG-MCG Oral Tab Take 1 tablet by mouth daily. 84 tablet 0    ergocalciferol 1.25 MG (71846 UT) Oral Cap Take 1 capsule (50,000 Units total) by mouth every 14 (fourteen) days. 6 capsule 0    Mometasone Furo-Formoterol Fum (DULERA) 100-5 MCG/ACT Inhalation Aerosol 2 PUFFS BID 1 each 0    albuterol 108 (90 Base) MCG/ACT Inhalation Aero Soln Inhale 2 puffs into the lungs every 4 (four) hours as needed for Wheezing. 3 each 1    Multiple Vitamin (MULTIVITAMIN ADULT OR) Take by mouth.         Past Medical History:    Asthma ([facility])    Eczema    Lichen planopilaris    Nasal mass    benign, excision of mass     Nasal polyposis    Nasal polyposis    Varicose veins of legs     Past Surgical History:   Procedure Laterality Date    Other surgical history      excision nasal mass, benign     Allergies[1]  Family History   Problem Relation Age of Onset    Diabetes Other     Other (Other[other]) Mother         allergies, asthma, eczema    Diabetes Mother     Hypertension Brother     Other (Other[other]) Brother         asthma, eczema         ROS:     Review of Systems:  Review of Systems   Constitutional:  Negative for appetite change and fever.   Respiratory:  Negative for chest tightness and shortness of breath.    Cardiovascular:  Negative for chest pain, palpitations and leg swelling.   Gastrointestinal:  Negative for abdominal pain, constipation and diarrhea.   Endocrine: Negative for cold intolerance, heat intolerance, polydipsia and polyuria.   Genitourinary: Negative.  Negative for dyspareunia, dysuria, genital sores, menstrual problem, pelvic pain, urgency and vaginal discharge.   Musculoskeletal:  Negative for myalgias.   Neurological: Negative.  Negative for dizziness and headaches.   Psychiatric/Behavioral:  Negative for dysphoric mood and suicidal ideas.           LMP 10/02/2024 (Approximate)     Exam:   GENERAL: well developed, well nourished, in no apparent distress  SKIN: no rashes, no lesions  HEENT: normal  LUNGS: respiration unlabored  CARDIOVASCULAR: no peripheral edema or varicosities, skin warm and dry  BREASTS: bilaterally nontender, no palpable masses, no nipple discharge, no skin changes, no axillary adenopathy  ABDOMEN: Soft, non distended; non tender, no masses  GYNE/:   External Genitalia: normal, no lesions, good perineal support  Urethra: meatus normal  Bladder: well supported  Vagina: normal mucosa, no lesions, scant white discharge   Uterus: normal size, mobile, nontender  Cervix: normal os, no lesions or bleeding  Adnexa: normal size, bilaterally nontender, no palpable masses  Cul-de-sac:  normal  R/V: normal perineum, no hemorrhoids  EXTREMITIES: nontender without edema        A/P: Patient is 39 year old female here for well-woman exam.       #Annual exam   -Breast and pelvic exam as above  -Family planning: OCPs, refills sent  -STI screening: GCCT, HIV, RPR, Hepatitis serologies ordered  -Pap screening: NILM HPV negative 12/2022  -Immunizations: has severe egg allergy and gets alternative flu shot from her PCP  -Mammogram: due 10/2025  -Counseling: discussed continuous OCP use, ovarian and colon cancer risk reduction, possibility of getting pregnant any time prior to menopause, use of OCPs for menstrual regulation    Follow up 1yr       Pamela Chin DO                      [1]   Allergies  Allergen Reactions    Egg RESPIRATORY FAILURE and ANAPHYLAXIS    Seafood UNKNOWN    Shellfish RESPIRATORY FAILURE

## 2025-01-03 ENCOUNTER — LAB ENCOUNTER (OUTPATIENT)
Dept: LAB | Facility: HOSPITAL | Age: 40
End: 2025-01-03
Attending: OBSTETRICS & GYNECOLOGY
Payer: COMMERCIAL

## 2025-01-03 ENCOUNTER — OFFICE VISIT (OUTPATIENT)
Dept: OBGYN CLINIC | Facility: CLINIC | Age: 40
End: 2025-01-03
Payer: COMMERCIAL

## 2025-01-03 VITALS
HEIGHT: 66 IN | SYSTOLIC BLOOD PRESSURE: 124 MMHG | DIASTOLIC BLOOD PRESSURE: 72 MMHG | WEIGHT: 185.63 LBS | BODY MASS INDEX: 29.83 KG/M2

## 2025-01-03 DIAGNOSIS — Z01.419 ENCOUNTER FOR ANNUAL ROUTINE GYNECOLOGICAL EXAMINATION: Primary | ICD-10-CM

## 2025-01-03 DIAGNOSIS — Z11.3 SCREENING EXAMINATION FOR STI: ICD-10-CM

## 2025-01-03 LAB
HBV SURFACE AG SER-ACNC: 0.14 [IU]/L
HBV SURFACE AG SERPL QL IA: NONREACTIVE
HCV AB SERPL QL IA: NONREACTIVE

## 2025-01-03 PROCEDURE — 99396 PREV VISIT EST AGE 40-64: CPT | Performed by: OBSTETRICS & GYNECOLOGY

## 2025-01-03 PROCEDURE — 87389 HIV-1 AG W/HIV-1&-2 AB AG IA: CPT | Performed by: OBSTETRICS & GYNECOLOGY

## 2025-01-03 PROCEDURE — 87491 CHLMYD TRACH DNA AMP PROBE: CPT | Performed by: OBSTETRICS & GYNECOLOGY

## 2025-01-03 PROCEDURE — 86803 HEPATITIS C AB TEST: CPT | Performed by: OBSTETRICS & GYNECOLOGY

## 2025-01-03 PROCEDURE — 3078F DIAST BP <80 MM HG: CPT | Performed by: OBSTETRICS & GYNECOLOGY

## 2025-01-03 PROCEDURE — 3074F SYST BP LT 130 MM HG: CPT | Performed by: OBSTETRICS & GYNECOLOGY

## 2025-01-03 PROCEDURE — 87340 HEPATITIS B SURFACE AG IA: CPT | Performed by: OBSTETRICS & GYNECOLOGY

## 2025-01-03 PROCEDURE — 86592 SYPHILIS TEST NON-TREP QUAL: CPT | Performed by: OBSTETRICS & GYNECOLOGY

## 2025-01-03 PROCEDURE — 3008F BODY MASS INDEX DOCD: CPT | Performed by: OBSTETRICS & GYNECOLOGY

## 2025-01-03 RX ORDER — DESOGESTREL AND ETHINYL ESTRADIOL 0.15-0.03
1 KIT ORAL DAILY
Qty: 28 TABLET | Refills: 12 | Status: SHIPPED | OUTPATIENT
Start: 2025-01-03 | End: 2026-01-03

## 2025-01-04 LAB — RPR SER QL: NONREACTIVE

## 2025-01-06 LAB — C TRACH DNA SPEC QL NAA+PROBE: NEGATIVE

## 2025-03-09 NOTE — TELEPHONE ENCOUNTER
Problem: Chronic Conditions and Co-morbidities  Goal: Patient's chronic conditions and co-morbidity symptoms are monitored and maintained or improved  Outcome: Progressing     Problem: Pain  Goal: Verbalizes/displays adequate comfort level or baseline comfort level  Outcome: Progressing  Flowsheets (Taken 3/8/2025 2236)  Verbalizes/displays adequate comfort level or baseline comfort level:   Encourage patient to monitor pain and request assistance   Assess pain using appropriate pain scale   Administer analgesics based on type and severity of pain and evaluate response   Implement non-pharmacological measures as appropriate and evaluate response   Consider cultural and social influences on pain and pain management   Notify Licensed Independent Practitioner if interventions unsuccessful or patient reports new pain     Problem: Postpartum  Goal: Experiences normal postpartum course  Description:  Postpartum OB-Pregnancy care plan goal which identifies if the mother is experiencing a normal postpartum course  Outcome: Progressing     Problem: Postpartum  Goal: Appropriate maternal -  bonding  Description:  Postpartum OB-Pregnancy care plan goal which identifies if the mother and  are bonding appropriately  Outcome: Progressing     Problem: Postpartum  Goal: Establishment of infant feeding pattern  Description:  Postpartum OB-Pregnancy care plan goal which identifies if the mother is establishing a feeding pattern with their   Outcome: Progressing     Problem: Postpartum  Goal: Incisions, wounds, or drain sites healing without S/S of infection  Outcome: Progressing     Problem: Infection - Adult  Goal: Absence of infection at discharge  Outcome: Progressing     Problem: Infection - Adult  Goal: Absence of infection during hospitalization  Outcome: Progressing     Problem: Infection - Adult  Goal: Absence of fever/infection during anticipated neutropenic period  Outcome: Progressing     Problem:  Per pt [facility] sent over they dont have per pt has been taking a generic and would like that one sent to the pharmacy.  Please advise

## 2025-03-31 ENCOUNTER — TELEPHONE (OUTPATIENT)
Dept: INTERNAL MEDICINE CLINIC | Facility: CLINIC | Age: 40
End: 2025-03-31

## 2025-03-31 NOTE — TELEPHONE ENCOUNTER
Patient is requesting an annual physical appointment with any provider available to today either in [place] or [place] site.    Patient had an annual physical appointment scheduled with Dr. Pringle at [place] on 3/31/25 and was turned away because the provider does not have appointmentson Monday in [place].    Patient is upset because she drove over an hour and planned for this visit about a year and is requesting an appointment with any available provider today.    Please call patient to assist.

## 2025-07-03 ENCOUNTER — LAB ENCOUNTER (OUTPATIENT)
Dept: LAB | Facility: HOSPITAL | Age: 40
End: 2025-07-03
Attending: INTERNAL MEDICINE
Payer: COMMERCIAL

## 2025-07-03 ENCOUNTER — OFFICE VISIT (OUTPATIENT)
Dept: INTERNAL MEDICINE CLINIC | Facility: CLINIC | Age: 40
End: 2025-07-03
Payer: COMMERCIAL

## 2025-07-03 VITALS
WEIGHT: 181 LBS | TEMPERATURE: 98 F | SYSTOLIC BLOOD PRESSURE: 108 MMHG | BODY MASS INDEX: 29.79 KG/M2 | DIASTOLIC BLOOD PRESSURE: 71 MMHG | HEART RATE: 95 BPM | HEIGHT: 65.5 IN

## 2025-07-03 DIAGNOSIS — Z00.00 PHYSICAL EXAM: Primary | ICD-10-CM

## 2025-07-03 DIAGNOSIS — J45.30 MILD PERSISTENT ASTHMA WITHOUT COMPLICATION (HCC): ICD-10-CM

## 2025-07-03 DIAGNOSIS — Z00.00 PHYSICAL EXAM: ICD-10-CM

## 2025-07-03 LAB
ALBUMIN SERPL-MCNC: 4.6 G/DL (ref 3.2–4.8)
ALBUMIN/GLOB SERPL: 1.8 {RATIO} (ref 1–2)
ALP LIVER SERPL-CCNC: 40 U/L (ref 37–98)
ALT SERPL-CCNC: 15 U/L (ref 10–49)
ANION GAP SERPL CALC-SCNC: 9 MMOL/L (ref 0–18)
AST SERPL-CCNC: 20 U/L (ref ?–34)
BASOPHILS # BLD AUTO: 0.04 X10(3) UL (ref 0–0.2)
BASOPHILS NFR BLD AUTO: 0.6 %
BILIRUB SERPL-MCNC: 0.8 MG/DL (ref 0.3–1.2)
BILIRUB UR QL: NEGATIVE
BUN BLD-MCNC: 13 MG/DL (ref 9–23)
BUN/CREAT SERPL: 15.7 (ref 10–20)
CALCIUM BLD-MCNC: 8.7 MG/DL (ref 8.7–10.4)
CHLORIDE SERPL-SCNC: 102 MMOL/L (ref 98–112)
CHOLEST SERPL-MCNC: 183 MG/DL (ref ?–200)
CLARITY UR: CLEAR
CO2 SERPL-SCNC: 28 MMOL/L (ref 21–32)
CREAT BLD-MCNC: 0.83 MG/DL (ref 0.55–1.02)
DEPRECATED RDW RBC AUTO: 37.9 FL (ref 35.1–46.3)
EGFRCR SERPLBLD CKD-EPI 2021: 91 ML/MIN/1.73M2 (ref 60–?)
EOSINOPHIL # BLD AUTO: 0.22 X10(3) UL (ref 0–0.7)
EOSINOPHIL NFR BLD AUTO: 3.4 %
ERYTHROCYTE [DISTWIDTH] IN BLOOD BY AUTOMATED COUNT: 12.1 % (ref 11–15)
EST. AVERAGE GLUCOSE BLD GHB EST-MCNC: 105 MG/DL (ref 68–126)
FASTING PATIENT LIPID ANSWER: YES
FASTING STATUS PATIENT QL REPORTED: YES
GLOBULIN PLAS-MCNC: 2.6 G/DL (ref 2–3.5)
GLUCOSE BLD-MCNC: 89 MG/DL (ref 70–99)
GLUCOSE UR-MCNC: NORMAL MG/DL
HBA1C MFR BLD: 5.3 % (ref ?–5.7)
HCT VFR BLD AUTO: 39.1 % (ref 35–48)
HDLC SERPL-MCNC: 94 MG/DL (ref 40–59)
HGB BLD-MCNC: 12.7 G/DL (ref 12–16)
HGB UR QL STRIP.AUTO: NEGATIVE
IMM GRANULOCYTES # BLD AUTO: 0.01 X10(3) UL (ref 0–1)
IMM GRANULOCYTES NFR BLD: 0.2 %
KETONES UR-MCNC: NEGATIVE MG/DL
LDLC SERPL CALC-MCNC: 78 MG/DL (ref ?–100)
LEUKOCYTE ESTERASE UR QL STRIP.AUTO: NEGATIVE
LYMPHOCYTES # BLD AUTO: 1.62 X10(3) UL (ref 1–4)
LYMPHOCYTES NFR BLD AUTO: 25 %
MCH RBC QN AUTO: 27.8 PG (ref 26–34)
MCHC RBC AUTO-ENTMCNC: 32.5 G/DL (ref 31–37)
MCV RBC AUTO: 85.6 FL (ref 80–100)
MONOCYTES # BLD AUTO: 0.73 X10(3) UL (ref 0.1–1)
MONOCYTES NFR BLD AUTO: 11.3 %
NEUTROPHILS # BLD AUTO: 3.86 X10 (3) UL (ref 1.5–7.7)
NEUTROPHILS # BLD AUTO: 3.86 X10(3) UL (ref 1.5–7.7)
NEUTROPHILS NFR BLD AUTO: 59.5 %
NITRITE UR QL STRIP.AUTO: NEGATIVE
NONHDLC SERPL-MCNC: 89 MG/DL (ref ?–130)
OSMOLALITY SERPL CALC.SUM OF ELEC: 288 MOSM/KG (ref 275–295)
PH UR: 7.5 [PH] (ref 5–8)
PLATELET # BLD AUTO: 323 10(3)UL (ref 150–450)
POTASSIUM SERPL-SCNC: 3.2 MMOL/L (ref 3.5–5.1)
PROT SERPL-MCNC: 7.2 G/DL (ref 5.7–8.2)
PROT UR-MCNC: NEGATIVE MG/DL
RBC # BLD AUTO: 4.57 X10(6)UL (ref 3.8–5.3)
SODIUM SERPL-SCNC: 139 MMOL/L (ref 136–145)
SP GR UR STRIP: 1.02 (ref 1–1.03)
T4 FREE SERPL-MCNC: 1.1 NG/DL (ref 0.8–1.7)
TRIGL SERPL-MCNC: 57 MG/DL (ref 30–149)
TSI SER-ACNC: 1.06 UIU/ML (ref 0.55–4.78)
UROBILINOGEN UR STRIP-ACNC: NORMAL
VLDLC SERPL CALC-MCNC: 9 MG/DL (ref 0–30)
WBC # BLD AUTO: 6.5 X10(3) UL (ref 4–11)

## 2025-07-03 PROCEDURE — 80061 LIPID PANEL: CPT

## 2025-07-03 PROCEDURE — 83036 HEMOGLOBIN GLYCOSYLATED A1C: CPT

## 2025-07-03 PROCEDURE — 80053 COMPREHEN METABOLIC PANEL: CPT

## 2025-07-03 PROCEDURE — 81003 URINALYSIS AUTO W/O SCOPE: CPT

## 2025-07-03 PROCEDURE — 36415 COLL VENOUS BLD VENIPUNCTURE: CPT

## 2025-07-03 PROCEDURE — 84439 ASSAY OF FREE THYROXINE: CPT

## 2025-07-03 PROCEDURE — 85025 COMPLETE CBC W/AUTO DIFF WBC: CPT

## 2025-07-03 PROCEDURE — 84443 ASSAY THYROID STIM HORMONE: CPT

## 2025-07-05 ENCOUNTER — PATIENT MESSAGE (OUTPATIENT)
Dept: INTERNAL MEDICINE CLINIC | Facility: CLINIC | Age: 40
End: 2025-07-05

## 2025-07-05 DIAGNOSIS — J45.30 MILD PERSISTENT ASTHMA WITHOUT COMPLICATION (HCC): ICD-10-CM

## 2025-07-05 DIAGNOSIS — E87.6 HYPOKALEMIA: Primary | ICD-10-CM

## 2025-07-08 NOTE — PROGRESS NOTES
HPI:    Patient ID: Roula Marshall is a 39 year old female.    HPI    Physical exam  Generally healthy    /71 (BP Location: Right arm, Patient Position: Sitting, Cuff Size: adult)   Pulse 95   Temp 97.5 °F (36.4 °C) (Temporal)   Ht 5' 5.5\" (1.664 m)   Wt 181 lb (82.1 kg)   LMP 06/04/2025 (Approximate)   BMI 29.66 kg/m²   Wt Readings from Last 6 Encounters:   07/03/25 181 lb (82.1 kg)   01/03/25 185 lb 9.6 oz (84.2 kg)   12/07/24 188 lb (85.3 kg)   03/29/24 173 lb (78.5 kg)   01/03/24 166 lb (75.3 kg)   12/16/23 166 lb (75.3 kg)     Body mass index is 29.66 kg/m².  HGBA1C:    Lab Results   Component Value Date    A1C 5.3 07/03/2025    A1C 5.3 03/29/2024    A1C 5.3 11/22/2022     07/03/2025         Review of Systems   Constitutional:  Negative for activity change, chills, fatigue and fever.   HENT:  Negative for ear discharge, nosebleeds, postnasal drip, rhinorrhea, sinus pressure and sore throat.    Eyes:  Negative for pain, discharge and redness.   Respiratory:  Negative for cough, chest tightness, shortness of breath and wheezing.    Cardiovascular:  Negative for chest pain, palpitations and leg swelling.   Gastrointestinal:  Negative for abdominal pain, blood in stool, constipation, diarrhea, nausea and vomiting.   Genitourinary:  Negative for difficulty urinating, dysuria, frequency, hematuria and urgency.   Musculoskeletal:  Negative for back pain, gait problem and joint swelling.   Skin:  Negative for rash.   Neurological:  Negative for syncope, weakness, light-headedness and headaches.   Psychiatric/Behavioral:  Negative for dysphoric mood. The patient is not nervous/anxious.          Current Medications[1]  Allergies:Allergies[2]    HISTORY:  Past Medical History[3]   Past Surgical History[4]   Family History[5]   Social History: Short Social Hx on File[6]     PHYSICAL EXAM:    Physical Exam  Constitutional:       General: She is not in acute distress.     Appearance: She is  well-developed. She is not diaphoretic.   HENT:      Head: Normocephalic and atraumatic.      Right Ear: Ear canal normal.      Left Ear: Ear canal normal.      Nose: Nose normal.      Mouth/Throat:      Pharynx: No oropharyngeal exudate or posterior oropharyngeal erythema.   Eyes:      General: No scleral icterus.        Right eye: No discharge.         Left eye: No discharge.      Conjunctiva/sclera: Conjunctivae normal.      Pupils: Pupils are equal, round, and reactive to light.   Cardiovascular:      Rate and Rhythm: Normal rate and regular rhythm.      Heart sounds: Normal heart sounds. No murmur heard.  Pulmonary:      Effort: Pulmonary effort is normal. No respiratory distress.      Breath sounds: Normal breath sounds. No wheezing.   Abdominal:      General: Bowel sounds are normal.      Palpations: Abdomen is soft. There is no mass.      Tenderness: There is no abdominal tenderness. There is no guarding or rebound.      Hernia: No hernia is present.   Musculoskeletal:         General: No tenderness.   Skin:     General: Skin is warm and dry.      Findings: No lesion or rash.   Neurological:      Mental Status: She is alert.      Gait: Gait normal.   Psychiatric:         Behavior: Behavior normal.         Thought Content: Thought content normal.              ASSESSMENT/PLAN:   (Z00.00) Physical exam  (primary encounter diagnosis)  Plan: CBC With Differential With Platelet, Comp         Metabolic Panel (14), Lipid Panel, Hemoglobin         A1C, TSH and Free T4, Urinalysis, Routine        Generally healthy  Follow up with gyne for exam pap     (J45.30) Mild persistent asthma without complication (HCC)  Plan: stable  cpm    Patient voiced understanding  and agrees with plan         Orders Placed This Encounter   Procedures    CBC With Differential With Platelet    Comp Metabolic Panel (14)    Lipid Panel    Hemoglobin A1C    TSH and Free T4    Urinalysis, Routine       Meds This Visit:  Requested Prescriptions       No prescriptions requested or ordered in this encounter       Imaging & Referrals:  None        ID#[id number]           [1]   Current Outpatient Medications   Medication Sig Dispense Refill    Desogestrel-Ethinyl Estradiol (ISIBLOOM) 0.15-30 MG-MCG Oral Tab Take 1 tablet by mouth daily. 28 tablet 12    albuterol 108 (90 Base) MCG/ACT Inhalation Aero Soln Inhale 2 puffs into the lungs every 4 (four) hours as needed for Wheezing. 3 each 1    Multiple Vitamin (MULTIVITAMIN ADULT OR) Take by mouth.      predniSONE 10 MG Oral Tab 4 tabs daily for 3 days, then 3 tabs daily for 3 days, then 2 tabs daily for 3 days, 1 tab daily for 3 days. (Patient not taking: Reported on 7/3/2025) 30 tablet 0    ergocalciferol 1.25 MG (02351 UT) Oral Cap Take 1 capsule (50,000 Units total) by mouth every 14 (fourteen) days. 6 capsule 0    Mometasone Furo-Formoterol Fum (DULERA) 100-5 MCG/ACT Inhalation Aerosol 2 PUFFS BID 1 each 0   [2]   Allergies  Allergen Reactions    Egg RESPIRATORY FAILURE and ANAPHYLAXIS    Seafood UNKNOWN    Shellfish RESPIRATORY FAILURE   [3]   Past Medical History:   Asthma (HCC)    Eczema    Lichen planopilaris    Nasal mass    benign, excision of mass    Nasal polyposis    Nasal polyposis    Varicose veins of legs   [4]   Past Surgical History:  Procedure Laterality Date    Other surgical history      excision nasal mass, benign   [5]   Family History  Problem Relation Age of Onset    Diabetes Other     Other (Other[other]) Mother         allergies, asthma, eczema    Diabetes Mother     Hypertension Brother     Other (Other[other]) Brother         asthma, eczema   [6]   Social History  Socioeconomic History    Marital status:    Tobacco Use    Smoking status: Never     Passive exposure: Never    Smokeless tobacco: Never   Vaping Use    Vaping status: Never Used   Substance and Sexual Activity    Alcohol use: Yes    Drug use: Never    Sexual activity: Yes     Partners: Male     Birth control/protection: OCP    Other Topics Concern    Blood Transfusions No    Caffeine Concern No    Breast feeding No    Reaction to local anesthetic No    Pt has a pacemaker No    Pt has a defibrillator No     Social Drivers of Health     Food Insecurity: No Food Insecurity (7/3/2025)    NCSS - Food Insecurity     Worried About Running Out of Food in the Last Year: No     Ran Out of Food in the Last Year: No   Transportation Needs: No Transportation Needs (7/3/2025)    NCSS - Transportation     Lack of Transportation: No   Housing Stability: Not At Risk (7/3/2025)    NCSS - Housing/Utilities     Has Housing: Yes     Worried About Losing Housing: No     Unable to Get Utilities: No

## 2025-07-09 RX ORDER — ALBUTEROL SULFATE 90 UG/1
2 INHALANT RESPIRATORY (INHALATION) EVERY 4 HOURS PRN
Qty: 3 EACH | Refills: 1 | Status: SHIPPED | OUTPATIENT
Start: 2025-07-09

## 2025-07-18 ENCOUNTER — PATIENT MESSAGE (OUTPATIENT)
Dept: INTERNAL MEDICINE CLINIC | Facility: CLINIC | Age: 40
End: 2025-07-18

## 2025-07-18 DIAGNOSIS — Z12.31 ENCOUNTER FOR SCREENING MAMMOGRAM FOR BREAST CANCER: Primary | ICD-10-CM

## 2025-07-22 ENCOUNTER — TELEMEDICINE (OUTPATIENT)
Dept: INTERNAL MEDICINE CLINIC | Facility: CLINIC | Age: 40
End: 2025-07-22
Payer: COMMERCIAL

## 2025-07-22 ENCOUNTER — TELEPHONE (OUTPATIENT)
Dept: INTERNAL MEDICINE CLINIC | Facility: CLINIC | Age: 40
End: 2025-07-22

## 2025-07-22 DIAGNOSIS — E66.09 CLASS 1 OBESITY DUE TO EXCESS CALORIES WITHOUT SERIOUS COMORBIDITY WITH BODY MASS INDEX (BMI) OF 30.0 TO 30.9 IN ADULT: Primary | ICD-10-CM

## 2025-07-22 DIAGNOSIS — E66.811 CLASS 1 OBESITY DUE TO EXCESS CALORIES WITHOUT SERIOUS COMORBIDITY WITH BODY MASS INDEX (BMI) OF 30.0 TO 30.9 IN ADULT: Primary | ICD-10-CM

## 2025-07-22 PROCEDURE — 98005 SYNCH AUDIO-VIDEO EST LOW 20: CPT | Performed by: NURSE PRACTITIONER

## 2025-07-22 RX ORDER — TIRZEPATIDE 2.5 MG/.5ML
2.5 INJECTION, SOLUTION SUBCUTANEOUS WEEKLY
Qty: 2 ML | Refills: 0 | Status: SHIPPED | OUTPATIENT
Start: 2025-07-22 | End: 2025-07-23

## 2025-07-22 NOTE — ASSESSMENT & PLAN NOTE
Weight loss Program  Must come in for office visit in 1 month to be weighed  Yes the medication will be used with a reduced calorie diet and increased physical activity to reduce excess body weight and maintain weight reduction long term.    No contraindications (Reviewed)    Lima Rivera DNP  Working with

## 2025-07-22 NOTE — PROGRESS NOTES
HPI:   Telehealth outside of [facility]  Telehealth Verbal Consent   I conducted a telehealth visit with Roula Marshall today, 07/22/25, which was completed using two-way, real-time interactive audio and video communication. This has been done in good jes to provide continuity of care in the best interest of the provider-patient relationship, due to the COVID - public health crisis/national emergency where restrictions of face-to-face office visits are ongoing. Every conscious effort was taken to allow for sufficient and adequate time to complete the visit.  The patient was made aware of the limitations of the telehealth visit, including treatment limitations as no physical exam could be performed.  The patient was advised to call 911 or to go to the ER in case there was an emergency.  The patient was also advised of the potential privacy & security concerns related to the telehealth platform.   The patient was made aware of where to find [facility]'s notice of privacy practices, telehealth consent form and other related consent forms and documents.  which are located on the [facility] website. The patient verbally agreed to telehealth consent form, related consents and the risks discussed.    Lastly, the patient confirmed that they were in [place].   Included in this visit, time may have been spent reviewing labs, medications, radiology tests and decision making. Appropriate medical decision-making and tests are ordered as detailed in the plan of care above.  Coding/billing information is submitted for this visit based on complexity of care and/or time spent for the visit.      Patient name: Roula Marshall          Patient ID: Roula Marshall is a 40 year old female.    HPI Weight loss Medication  40 year old female who has tried oral diet pills   She has tried numerous diet plans without success  She is requesting to start Zepbound  She has been approved for Zepbound  Her sister has used Zepbound and it has worked for  her.  History of Present Illness  Wt Readings from Last 6 Encounters:   07/03/25 181 lb (82.1 kg)   01/03/25 185 lb 9.6 oz (84.2 kg)   12/07/24 188 lb (85.3 kg)   03/29/24 173 lb (78.5 kg)   01/03/24 166 lb (75.3 kg)   12/16/23 166 lb (75.3 kg)       No hx of thyroid cancer in your family  No hx of pancreatitis  No hx of gallbladder disease    Immunization History   Administered Date(s) Administered    Pneumococcal Conjugate PCV20 11/22/2022    TDAP 01/02/2019   Deferred Date(s) Deferred    FLULAVAL 6 months & older 0.5 ml Prefilled syringe (06410) 11/22/2022       Past Medical History:    Asthma (HCC)    Eczema    Lichen planopilaris    Nasal mass    benign, excision of mass    Nasal polyposis    Nasal polyposis    Varicose veins of legs      Past Surgical History:   Procedure Laterality Date    Other surgical history      excision nasal mass, benign      Social History     Socioeconomic History    Marital status:    Tobacco Use    Smoking status: Never     Passive exposure: Never    Smokeless tobacco: Never   Vaping Use    Vaping status: Never Used   Substance and Sexual Activity    Alcohol use: Yes    Drug use: Never    Sexual activity: Yes     Partners: Male     Birth control/protection: OCP   Other Topics Concern    Blood Transfusions No    Caffeine Concern No    Breast feeding No    Reaction to local anesthetic No    Pt has a pacemaker No    Pt has a defibrillator No          Review of Systems   Constitutional:  Negative for chills, fatigue and fever.   HENT:  Negative for ear pain, hearing loss, sinus pain, sore throat and trouble swallowing.    Eyes:  Negative for pain and visual disturbance.   Respiratory:  Negative for cough, chest tightness and shortness of breath.    Cardiovascular:  Negative for chest pain, palpitations and leg swelling.   Gastrointestinal:  Negative for abdominal pain, constipation, diarrhea, nausea and vomiting.   Endocrine: Negative for cold intolerance and heat intolerance.    Genitourinary:  Negative for dysuria and hematuria.   Musculoskeletal:  Negative for back pain and joint swelling.   Skin:  Negative for rash.   Allergic/Immunologic: Negative for environmental allergies.   Neurological:  Negative for weakness, numbness and headaches.   Hematological:  Does not bruise/bleed easily.   Psychiatric/Behavioral:  Negative for dysphoric mood and sleep disturbance. The patient is not nervous/anxious.               Current Outpatient Medications   Medication Sig Dispense Refill    Tirzepatide-Weight Management (ZEPBOUND) 2.5 MG/0.5ML Subcutaneous Solution Auto-injector Inject 2.5 mg into the skin once a week for 4 doses. 2 mL 0    albuterol 108 (90 Base) MCG/ACT Inhalation Aero Soln Inhale 2 puffs into the lungs every 4 (four) hours as needed for Wheezing. 3 each 1    Multiple Vitamin (MULTIVITAMIN ADULT OR) Take by mouth.       Allergies:  Allergies   Allergen Reactions    Egg RESPIRATORY FAILURE and ANAPHYLAXIS    Seafood UNKNOWN    Shellfish RESPIRATORY FAILURE      PHYSICAL EXAM:   Physical Exam  Constitutional:       Appearance: Normal appearance.   HENT:      Head: Normocephalic.      Mouth/Throat:      Mouth: Mucous membranes are moist.   Pulmonary:      Effort: Pulmonary effort is normal.   Musculoskeletal:         General: Normal range of motion.   Skin:     General: Skin is dry.   Neurological:      Mental Status: She is alert and oriented to person, place, and time.   Psychiatric:         Mood and Affect: Mood normal.         Behavior: Behavior normal.         Thought Content: Thought content normal.         Judgment: Judgment normal.       LMP 06/04/2025 (Approximate)   Wt Readings from Last 2 Encounters:   07/03/25 181 lb (82.1 kg)   01/03/25 185 lb 9.6 oz (84.2 kg)     There is no height or weight on file to calculate BMI.(2)  Lab Results   Component Value Date    WBC 6.5 07/03/2025    RBC 4.57 07/03/2025    HGB 12.7 07/03/2025    HCT 39.1 07/03/2025    MCV 85.6 07/03/2025     MCH 27.8 07/03/2025    MCHC 32.5 07/03/2025    RDW 12.1 07/03/2025    .0 07/03/2025    MPV 9.3 02/16/2015      Lab Results   Component Value Date    GLU 89 07/03/2025    BUN 13 07/03/2025    BUNCREA 15.7 07/03/2025    CREATSERUM 0.83 07/03/2025    ANIONGAP 9 07/03/2025    GFRNAA 94 09/16/2021    GFRAA 108 09/16/2021    CA 8.7 07/03/2025    OSMOCALC 288 07/03/2025    ALKPHO 40 07/03/2025    AST 20 07/03/2025    ALT 15 07/03/2025    ALKPHOS 47 02/16/2015    BILT 0.8 07/03/2025    TP 7.2 07/03/2025    ALB 4.6 07/03/2025    GLOBULIN 2.6 07/03/2025    AGRATIO 1.4 02/16/2015     07/03/2025    K 3.2 (L) 07/03/2025     07/03/2025    CO2 28.0 07/03/2025      Lab Results   Component Value Date     07/03/2025    A1C 5.3 07/03/2025      Lab Results   Component Value Date    CHOLEST 183 07/03/2025    TRIG 57 07/03/2025    HDL 94 (H) 07/03/2025    LDL 78 07/03/2025    VLDL 9 07/03/2025    NONHDLC 89 07/03/2025    CALCNONHDL 100 02/16/2015      Lab Results   Component Value Date    T4F 1.1 07/03/2025    TSH 1.056 07/03/2025    TSHT4 0.46 01/13/2022                ASSESSMENT/PLAN:     Assessment & Plan  Class 1 obesity due to excess calories without serious comorbidity with body mass index (BMI) of 30.0 to 30.9 in adult  Weight loss Program  Must come in for office visit in 1 month to be weighed  Yes the medication will be used with a reduced calorie diet and increased physical activity to reduce excess body weight and maintain weight reduction long term.    No contraindications (Reviewed)    Lima Rivera DNP  Working with             No orders of the defined types were placed in this encounter.      Assessment & Plan         Meds This Visit:  Requested Prescriptions     Signed Prescriptions Disp Refills    Tirzepatide-Weight Management (ZEPBOUND) 2.5 MG/0.5ML Subcutaneous Solution Auto-injector 2 mL 0     Sig: Inject 2.5 mg into the skin once a week for 4 doses.       Imaging &  Referrals:  None         KRISTINE Camilo

## 2025-07-22 NOTE — TELEPHONE ENCOUNTER
Per harry review, mammogram has already been scheduled. No further action required.    Future Appointments   Date Time Provider Department Center   7/22/2025  4:00 PM Lima Rivera APRN [facility]   11/1/2025  7:20 AM [facility]

## 2025-07-23 ENCOUNTER — TELEPHONE (OUTPATIENT)
Dept: INTERNAL MEDICINE CLINIC | Facility: CLINIC | Age: 40
End: 2025-07-23

## 2025-07-23 NOTE — TELEPHONE ENCOUNTER
I called the patient, she reports that her BCP RX was cancelled yesterday at time of visit and she would like to know why and have that sent in. She has pills for this week only, had refill there to  but now RX is cancelled.     She understands that during discussion yesterday that BCP can be less effective with Zepbound but she does want to continue the BCP pills.    Please advise and if able to renew RX, please help with that    RX was given by Gyne Jan 2025      Pharmacy Actions and Admin    [facility] Actions     MAR Comment Waste Waste Unit          Outpatient Medication Detail     Disp Refills Start End    Desogestrel-Ethinyl Estradiol (ISIBLOOM) 0.15-30 MG-MCG Oral Tab (Discontinued) 28 tablet 12 1/3/2025 7/22/2025    Sig - Route: Take 1 tablet by mouth daily. - Oral    Sent to pharmacy as: Isibloom 0.15-30 MG-MCG Oral Tablet (Desogestrel-Ethinyl Estradiol)    Reason for Discontinue:  Stop This Medication    E-Prescribing Status: Receipt confirmed by pharmacy (1/3/2025  1:42 PM CST)    E-Cancel Status: Request approved by pharmacy (7/22/2025  4:22 PM CDT)       E-Cancel Status Note: Some or all of Rx dispensed; Last fill:07/15/25      This Order Has Been Discontinued    Order Status Reason By On   Discontinued  Stop This Medication Lima Rivera APRN 7/22/25 [id number]

## 2025-07-23 NOTE — TELEPHONE ENCOUNTER
Per patient she would like APRN to re-instate her birth control medication as soon as possible. Any question can call patient.

## 2025-07-23 NOTE — TELEPHONE ENCOUNTER
Spoke with patient, (  Name and date of birth verified ) informed of   Lima Rivera's instructions below      Patient said she spoke with Lima but hard to hear    She will send a [employer] message

## 2025-07-23 NOTE — TELEPHONE ENCOUNTER
Patient called for another reason in other encounter  During that call, patient reported that this medications needs PA    I did advise patient that she should contact plan and ask if this is medication that is covered      She is not sure what criteria she needs to meet for plan to cover, not aware of any exclusions  Also going to ask if other are covered aside from this.    Please continue with PA but patient advised to call us back with what she learns so we know how to best proceed with this.

## 2025-07-23 NOTE — TELEPHONE ENCOUNTER
Patient called  We had a discussion yesterday that the Tirzepatide has an interaction with her Isibloom  She said she wanted the Tirzepatite and that is why the ISIBloom was discontinued    Lima Rivera DNP  Working with

## 2025-07-23 NOTE — TELEPHONE ENCOUNTER
This medication is excluded by her plan    Close reason: Product not covered by this plan. Prior Authorization not available.

## 2025-07-26 ENCOUNTER — PATIENT MESSAGE (OUTPATIENT)
Dept: INTERNAL MEDICINE CLINIC | Facility: CLINIC | Age: 40
End: 2025-07-26

## 2025-07-28 NOTE — TELEPHONE ENCOUNTER
Patient calling for update on PA  Advised of below and reviewed with her. Advised if questions she can discuss with plan but exclusion is noted.     No other questions at this time.

## 2025-07-30 ENCOUNTER — TELEPHONE (OUTPATIENT)
Dept: INTERNAL MEDICINE CLINIC | Facility: CLINIC | Age: 40
End: 2025-07-30

## 2025-08-09 ENCOUNTER — LAB ENCOUNTER (OUTPATIENT)
Dept: LAB | Facility: HOSPITAL | Age: 40
End: 2025-08-09
Attending: INTERNAL MEDICINE

## 2025-08-09 DIAGNOSIS — E87.6 HYPOKALEMIA: ICD-10-CM

## 2025-08-09 LAB — POTASSIUM SERPL-SCNC: 3.5 MMOL/L (ref 3.5–5.1)

## 2025-08-09 PROCEDURE — 84132 ASSAY OF SERUM POTASSIUM: CPT

## 2025-08-09 PROCEDURE — 36415 COLL VENOUS BLD VENIPUNCTURE: CPT

## (undated) NOTE — LETTER
10/27/2017          To Whom It May Concern:    Carmen Moore is currently under my medical care . She  may return to work  on 10/30/17 .   Activity is restricted as follows:  None     If you require additional information please contact our office [phone]

## (undated) NOTE — LETTER
11/26/2019          To Whom It May Concern:    Chico Aguilar is currently under my medical care. She was seen in the office this morning. Please excuse her from the time missed today.       If you require additional information please contact our office

## (undated) NOTE — LETTER
1/26/2018          To Whom It May Concern:    Chico Aguilar is currently under my medical care and may not return to work at this time. Please excuse Raeann Padilla for 1 days on 1/26/2018. She may return to work on 1/27/2018.   Activity is restricted as foll

## (undated) NOTE — LETTER
8/5/2020          To Whom It May Concern:    Millicent Christianseno was currently seen today under my medical care patient may return to work 8/6/20. If you require additional information please contact our office.         Sincerely,    Charity Fam MD

## (undated) NOTE — LETTER
9/5/2017          To Whom It May Concern:    Thai Garrett is currently under my medical care and may not return to work at this time. Please excuse Tahira Ryaner for 1 days. She may return to work on 9/7/2017. Activity is restricted as follows: none.     I

## (undated) NOTE — MR AVS SNAPSHOT
After Visit Summary   12/30/2019    Sidra Camargo    MRN: [id number]           Visit Information     Date & Time  12/30/2019 10:40 AM Provider  Sree Becker, [address] [place] [address], [address] [place] [address],[address], [facility]  Phon THINPREP PAP SMEAR B [[id number] CUSTOM]     THINPREP PAP SMEAR ONLY [[id number] CUSTOM]     Future Labs/Procedures Expected by Expires    HPV HIGH RISK , THIN PREP COLLECTION [[id number] CUSTOM]  12/30/2019 12/30/2020    THINPREP PAP SMEAR B [[id number] CUSTOM]  12/ [facility]  Monday – Friday  8:00 am – 8:00 pm   Saturday – Sunday  8:00 am – 4:00 pm    WALK-IN CARE  Primary Care Providers  Treatment for acute illness   or injury that are   non-life-threatening.   Also available by appointment

## (undated) NOTE — LETTER
12/13/2019              Sage Alberts        [address] [address] [address]           To whom it may concern,    Marc Shields is under my care and will not be into work today.       Sincerely,      Jose Ferreira, KRISTINE Joshua ,

## (undated) NOTE — LETTER
4/13/2020              Usman Cyr        [address]        [place] [address]         To Whom It May Concern,    Usman Klarissa (7/10/1985) is under my medical care. She is unable to work at this time.   She will return on 4/17/2020      S

## (undated) NOTE — Clinical Note
Continue care with Dr. Jenaro Gann ultrasound at 32 weeks. Weekly NST's at 36 weeks. Level II ultrasound at ~ 20 weeks

## (undated) NOTE — LETTER
12/30/2019          To Whom It May Concern:    Willam Yeh is currently under my medical care. She was seen in my office today. Please excuse her from the time missed today. She may return to work without any restrictions.   If you require additional

## (undated) NOTE — LETTER
8/16/2019          To Whom It May Concern:    Krzysztof Harper is currently under my medical care and may not return to work at this time. Please excuse Ira Montgomery for 1 days. She may return to work on 8/19/19 .     If you require additional information plea

## (undated) NOTE — LETTER
1/17/2020              Sidra Camargo        [address] [facility] [address]         To Whom It May Concern,    Sidra Camargo (7/10/1985) is under my medical care. She was seen at [place] today for an office visit.         Sincerely,

## (undated) NOTE — LETTER
[address] [place], [place]      Authorization for Surgical Operation and Procedure     Date:_03/07/2021__________                                                                                                         Ti hemolytic reactions, transmission of diseases such as Hepatitis, AIDS and Cytomegalovirus (CMV) and fluid overload. In the event that I wish to have an autologous transfusion of my own blood, or a directed donor transfusion.   I will discuss this with my p purposes of reinstating the DNAR order. 10. Patients having a sterilization procedure: I understand that if the procedure is successful the results will be permanent and it will therefore be impossible for me to inseminate, conceive, or bear children.   I _____________________________  (Signature of Physician)                                                                                         (Date)                                   (Time)

## (undated) NOTE — Clinical Note
IUP at 30w5d  Normal fetal growth  Advanced maternal age, low risk cell free DNA screening  Asthma, mild uncomplicated     RECOMMENDATIONS:  Continue care with Dr. Yuridia Hairston  Weekly NST at 36 weeks

## (undated) NOTE — LETTER
10/30/2017              Letter by Arturo Velarde MD on 10/27/2017                                                                                                           [facility], [address]

## (undated) NOTE — LETTER
[address]      Authorization for Surgical Operation and Procedure     Date:_03/07/2021__________                                                                                                         Ti reactions, hemolytic reactions, transmission of diseases such as Hepatitis, AIDS and Cytomegalovirus (CMV) and fluid overload. In the event that I wish to have an autologous transfusion of my own blood, or a directed donor transfusion.   I will discuss thi ends for purposes of reinstating the DNAR order.   10. Patients having a sterilization procedure: I understand that if the procedure is successful the results will be permanent and it will therefore be impossible for me to inseminate, conceive, or bear chil _____________________________  (Signature of Physician)                                                                                         (Date)                                   (Time)

## (undated) NOTE — LETTER
9/20/2018          To Whom It May Concern:    Sharon Branch is currently under my medical care and may return to work at this time. Please excuse Daniel Mai for her visit to our office this morning. She may return to work on today.   Activity is restricte

## (undated) NOTE — LETTER
21          RE:  Osman Quigley  :  7/10/1985      To Whom It May Concern:    Osman Quigley is currently under our care for pregnancy. It is permissible at her gestational age to have dental work performed.  However, if x-rays are needed, please

## (undated) NOTE — LETTER
9/29/2017          To Whom It May Concern:    Juliet Murray is currently under my medical care and was seen in my office on 09/29/17 . She  may return to work on 10/02/17 .      Activity is restricted as follows: None     If you require additional inform

## (undated) NOTE — LETTER
[facility]  Administration of Anesthesia  1. Anabella Rodriguez, or _________________________________ acting on her behalf, (Patient) (Dependent/Representative) request to receive anesthesia for my pending procedure/operation/treatment.   A bleeding, seizure, cardiac arrest and death. 7. AWARENESS: I understand that it is possible (but unlikely) to have explicit memory of events from the operating room while under general anesthesia.   8. ELECTROCONVULSIVE THERAPY PATIENTS: This consent serve below affirms that prior to the time of the procedure, I have explained to the patient and/or his/her guardian, the risks and benefits of undergoing anesthesia, as well as any reasonable alternatives.     ___________________________________________________

## (undated) NOTE — LETTER
17          RE:  Rhonda Leos  :  7/10/1985      To Whom It May Concern:    Rhonda Leos was seen today (17) in my office for her medical care. If you have any additional concerns, do not hesitate to contact our office.     Sincerely,

## (undated) NOTE — LETTER
[facility]  Administration of Anesthesia  1. Marven Lennox, or _________________________________ acting on her behalf, (Patient) (Dependent/Representative) request to receive anesthesia for my pending procedure/operation/treatment.   A bleeding, seizure, cardiac arrest and death. 7. AWARENESS: I understand that it is possible (but unlikely) to have explicit memory of events from the operating room while under general anesthesia.   8. ELECTROCONVULSIVE THERAPY PATIENTS: This consent serve below affirms that prior to the time of the procedure, I have explained to the patient and/or his/her guardian, the risks and benefits of undergoing anesthesia, as well as any reasonable alternatives.     ___________________________________________________

## (undated) NOTE — LETTER
November 1, 2017    [address] [place] [address]      Dear Daniel Mai: The following are the results of your recent tests. Please review the list of test results.   Your result is the value on the left; we have also supplied the ran

## (undated) NOTE — LETTER
2/28/2020          To Whom It May Concern:    Manjinder Zuleta is currently under my medical care and was seen in the clinic on 2/28/20.  Due to your current medical condition she is required to wear comfortable shoes such as flats or crocs to avoid flare up